# Patient Record
Sex: FEMALE | Race: WHITE | NOT HISPANIC OR LATINO | Employment: FULL TIME | URBAN - METROPOLITAN AREA
[De-identification: names, ages, dates, MRNs, and addresses within clinical notes are randomized per-mention and may not be internally consistent; named-entity substitution may affect disease eponyms.]

---

## 2017-01-04 DIAGNOSIS — N39.3 FEMALE STRESS INCONTINENCE: Primary | ICD-10-CM

## 2017-01-04 RX ORDER — CEFAZOLIN SODIUM 1 G/3ML
1 INJECTION, POWDER, FOR SOLUTION INTRAMUSCULAR; INTRAVENOUS SEE ADMIN INSTRUCTIONS
Status: CANCELLED | OUTPATIENT
Start: 2017-01-04

## 2017-01-04 RX ORDER — CEFAZOLIN SODIUM 1 G/50ML
2 SOLUTION INTRAVENOUS
Status: CANCELLED | OUTPATIENT
Start: 2017-01-04

## 2017-01-06 DIAGNOSIS — N39.3 FEMALE STRESS INCONTINENCE: Primary | ICD-10-CM

## 2017-01-06 NOTE — NURSING NOTE
Discussed surgery details with patient   She is planning to have urine culture at the anesthesia visit     Orders placed     Regina Alan RN   Care Coordinator Urology

## 2017-01-12 ENCOUNTER — ANESTHESIA EVENT (OUTPATIENT)
Dept: SURGERY | Facility: AMBULATORY SURGERY CENTER | Age: 52
End: 2017-01-12

## 2017-01-13 ENCOUNTER — ALLIED HEALTH/NURSE VISIT (OUTPATIENT)
Dept: SURGERY | Facility: CLINIC | Age: 52
End: 2017-01-13

## 2017-01-13 ENCOUNTER — OFFICE VISIT (OUTPATIENT)
Dept: SURGERY | Facility: CLINIC | Age: 52
End: 2017-01-13

## 2017-01-13 VITALS
WEIGHT: 159 LBS | TEMPERATURE: 97.9 F | OXYGEN SATURATION: 98 % | BODY MASS INDEX: 23.55 KG/M2 | DIASTOLIC BLOOD PRESSURE: 77 MMHG | HEART RATE: 54 BPM | SYSTOLIC BLOOD PRESSURE: 132 MMHG | HEIGHT: 69 IN | RESPIRATION RATE: 16 BRPM

## 2017-01-13 DIAGNOSIS — N39.3 FEMALE STRESS INCONTINENCE: ICD-10-CM

## 2017-01-13 DIAGNOSIS — Z01.818 PREOP EXAMINATION: Primary | ICD-10-CM

## 2017-01-13 NOTE — PATIENT INSTRUCTIONS
Preparing for Your Surgery      Name:  Yelena Jacob   MRN:  [id number]   :  1965   Today's Date:  2017     Arriving for surgery:  Surgery date: 17  Surgery time:  0910  Arrival time:  0740  Please come to:     [facility]  [address] [place] [phone]    -  Proceed to the 5th floor to check into the Ambulatory Surgery Center.              >> There will be patient concierges on the 1st and 5th floor, for assistance or an escort, if you would like.              >> Please call [phone] with any questions.    What can I eat or drink?  -  You may have solid food or milk products until 8 hours prior to your surgery.  -  You may have water, apple juice or 7up/Sprite until 2 hours prior to your surgery.    Which medicines can I take?  -  Do NOT take these medications in the morning, the day of surgery:  Aspirin, ibuprofen, supplements    -  Please take these medications the day of surgery:  Tylenol if needed, effexor if taken in the morning    How do I prepare myself?  -  Take two showers: one the night before surgery; and one the morning of surgery.         Use Scrubcare or Hibiclens to wash from neck down.  You may use your own shampoo and conditioner. No other hair products.   -  Do NOT use lotion, powder, deodorant, or antiperspirant the day of your surgery.  -  Do NOT wear any makeup, fingernail polish or jewelry.  -  Begin using Incentive Spirometer 1 week prior to surgery.  Use 4 times per day, up to 5-10 breaths each time.  Bring Incentive Spirometer to hospital.  -Do not bring your own medications to the hospital, except for inhalers and eye drops.  -  Bring your ID and insurance card.    Questions or Concerns:  If you have questions or concerns, please call the  Preoperative Assessment Center, Monday-Friday 7AM-7PM:  [phone]

## 2017-01-13 NOTE — ANESTHESIA PREPROCEDURE EVALUATION
Anesthesia Evaluation     . Pt has had prior anesthetic. Type: General and MAC    No history of anesthetic complications     ROS/MED HX    ENT/Pulmonary:  - neg pulmonary ROS     Neurologic:     (+)migraines,     Cardiovascular:  - neg cardiovascular ROS       METS/Exercise Tolerance:  >4 METS   Hematologic:  - neg hematologic  ROS       Musculoskeletal:  - neg musculoskeletal ROS (+) , , other musculoskeletal- Back surgeries x 2      GI/Hepatic:  - neg GI/hepatic ROS       Renal/Genitourinary:     (+) Other Renal/ Genitourinary, Stress urinary incontinence      Endo:  - neg endo ROS       Psychiatric: Comment: Is not currently using the Effexor.    (+) psychiatric history anxiety and depression      Infectious Disease:  - neg infectious disease ROS       Malignancy:   (+) Malignancy History of Skin  Skin CA Remission status post Surgery,         Other:    - neg other ROS           Physical Exam  Normal systems: dental    Airway   Mallampati: I  TM distance: >3 FB  Neck ROM: full    Dental     Cardiovascular   Rhythm and rate: regular and normal  (-) no peripheral edema    Pulmonary    breath sounds clear to auscultation               PAC Discussion and Assessment    ASA Classification: 2  Case is suitable for: ASC  Anesthetic techniques and relevant risks discussed: MAC with GA as backup  Invasive monitoring and risk discussed: No  Types:   Possibility and Risk of blood transfusion discussed: No  NPO instructions given:   Additional anesthetic preparation and risks discussed:   Needs early admission to pre-op area:   Other:     PAC Resident/NP Anesthesia Assessment:  The patient is a relatively young, healthy, non-smoking, very physically active patient who is preparing for a relatively low-risk procedure.  No further evaluation needed prior to this procedure.    Revised Cardiac Risk Index: 0.4% risk of major adverse cardiac event.  Anesthesia considerations: Refer to PAC assessment in the anesthesia  records.  VTE risk: 0.26%  YG risk: Low  PONV risk score= 2.  (If > 2, anti-emetic intervention is recommended.)        Reviewed and Signed by PAC Mid-Level Provider/Resident  Mid-Level Provider/Resident: Sarah Smith CNP  Date: 1/13/17  Time: 1815    Attending Anesthesiologist Anesthesia Assessment:        Anesthesiologist:   Date:   Time:   Pass/Fail:   Disposition:     PAC Pharmacist Assessment:        Pharmacist:   Date:   Time:      Anesthesia Plan      History & Physical Review  History and physical reviewed and following examination; no interval change.    ASA Status:  1 .    NPO Status:  > 6 hours    Plan for MAC with Intravenous induction. Maintenance will be TIVA.  Reason for MAC:  Deep or markedly invasive procedure (G8)  PONV prophylaxis:  Ondansetron (or other 5HT-3) and Dexamethasone or Solumedrol       Postoperative Care  Postoperative pain management:  Oral pain medications and Multi-modal analgesia.      Consents  Anesthetic plan, risks, benefits and alternatives discussed with:  Patient..                          .

## 2017-01-13 NOTE — MR AVS SNAPSHOT
After Visit Summary   2017    Yelena Jacob    MRN: [id number]           Patient Information     Date Of Birth          1965        Visit Information        Provider Department      2017 11:00 AM Rn, [facility]        Care Instructions    Preparing for Your Surgery      Name:  Yelena Jacob   MRN:  [id number]   :  1965   Today's Date:  2017     Arriving for surgery:  Surgery date: 17  Surgery time:  0910  Arrival time:  0740  Please come to:     [facility]  [address] [place] [phone]    -  Proceed to the 5th floor to check into the Ambulatory Surgery Center.              >> There will be patient concierges on the 1st and 5th floor, for assistance or an escort, if you would like.              >> Please call [phone] with any questions.    What can I eat or drink?  -  You may have solid food or milk products until 8 hours prior to your surgery.  -  You may have water, apple juice or 7up/Sprite until 2 hours prior to your surgery.    Which medicines can I take?  -  Do NOT take these medications in the morning, the day of surgery:  Aspirin, ibuprofen, supplements    -  Please take these medications the day of surgery:  Tylenol if needed, effexor if taken in the morning    How do I prepare myself?  -  Take two showers: one the night before surgery; and one the morning of surgery.         Use Scrubcare or Hibiclens to wash from neck down.  You may use your own shampoo and conditioner. No other hair products.   -  Do NOT use lotion, powder, deodorant, or antiperspirant the day of your surgery.  -  Do NOT wear any makeup, fingernail polish or jewelry.  -  Begin using Incentive Spirometer 1 week prior to surgery.  Use 4 times per day, up to 5-10 breaths each time.  Bring Incentive Spirometer to hospital.  -Do not bring your own medications to the hospital, except for inhalers and eye drops.  -   Bring your ID and insurance card.    Questions or Concerns:  If you have questions or concerns, please call the  Preoperative Assessment Center, Monday-Friday 7AM-7PM:  [phone]                  Follow-ups after your visit        Your next 10 appointments already scheduled     Jan 13, 2017 11:30 AM   (Arrive by 11:15 AM)   PAC EVALUATION with  Pac Jonny 3   [facility]operative [facility] ([facility])    [address] [place] [address]  [address] [facility] [id number] [phone]            Jan 13, 2017 12:30 PM   (Arrive by 12:15 PM)   PAC Anesthesia Consult with  Pac Anesthesiologist   [facility] Preoperative Assessment [place] ([facility])    [address] [place] [address]  [address] [facility] [id number] [phone]            Jan 13, 2017 12:45 PM   LAB with  LAB   [facility] Lab ([facility])    [address] [place] [address]  [address] [facility] [id number] [phone]           Patient must bring picture ID.  Patient should be prepared to give a urine specimen  Please do not eat 10-12 hours before your appointment if you are coming in fasting for labs on lipids, cholesterol, or glucose (sugar).  Pregnant women should follow their Care Team instructions. Water with medications is okay. Do not drink coffee or other fluids.   If you have concerns about taking  your medications, please ask at office or if scheduling via [facility], send a message by clicking on Secure Messaging, Message Your Care Team.            Jan 17, 2017   Procedure with Bahman Noe MD   [facility] Procedure [facility] ([facility])    [address] [place] [address]  [address] [facility] [address] [phone]           Located in the Clinics and Surgery Center at [address] [place] [address].   parking is very convenient and highly recommended.  is a $6 flat rate fee; no tips accepted.  Both  and self  parkers should enter the main arrival plaza from [facility]; parking attendants will direct you based on your parking preference.            2017 10:00 AM   (Arrive by 9:45 AM)   Post-Op with Bahman Noe MD   [facility] ([facility])    [address] [id number] [phone]            Mar 08, 2017  2:45 PM   (Arrive by 2:30 PM)   Post-Op with Bahman Noe MD   [facility] and [facility] ([facility])    [address] [address] [phone]              Who to contact     Please call your clinic at [phone] to:    Ask questions about your health    Make or cancel appointments    Discuss your medicines    Learn about your test results    Speak to your doctor   If you have compliments or concerns about an experience at your clinic, or if you wish to file a complaint, please contact [facility] Patient Relations at [phone] or email us at [email or web address]         Additional Information About Your Visit        [employer] Information     [employer] is an electronic gateway that provides easy, online access to your medical records. With [employer], you can request a clinic appointment, read your test results, renew a prescription or communicate with your care team.     To sign up for [employer] visit the website at [email or web address]   You will be asked to enter the access code listed below, as well as some personal information. Please follow the directions to create your username and password.     Your access code is: [email or web address]  Expires: 2017  6:30 AM     Your access code will  in 90 days. If you need help or a new code, please contact your [facility] or call [phone] for assistance.        Care EveryWhere ID      This is your Care EveryWhere ID. This could be used by other organizations to access your [place] medical records  [id number]         Blood Pressure from Last 3 Encounters:   10/25/16 115/76   09/28/16 113/73   08/30/16 103/63    Weight from Last 3 Encounters:   10/25/16 69.673 kg (153 lb 9.6 oz)   09/28/16 70.308 kg (155 lb)   08/30/16 72.802 kg (160 lb 8 oz)              Today, you had the following     No orders found for display       Primary Care Provider    No Ref-Primary Verified       No address on file        Thank you!     Thank you for choosing [facility]  for your care. Our goal is always to provide you with excellent care. Hearing back from our patients is one way we can continue to improve our services. Please take a few minutes to complete the written survey that you may receive in the mail after your visit with us. Thank you!             Your Updated Medication List - Protect others around you: Learn how to safely use, store and throw away your medicines at www.disposemymeds.org.          This list is accurate as of: 1/13/17 11:03 AM.  Always use your most recent med list.                   Brand Name Dispense Instructions for use    hydrocortisone 25 MG Suppository    ANUSOL-HC    28 suppository    Place 1 suppository (25 mg) rectally 2 times daily       venlafaxine 37.5 MG 24 hr capsule    EFFEXOR-XR    90 capsule    Take 1 capsule (37.5 mg) by mouth daily

## 2017-01-13 NOTE — H&P
Pre-Operative H & P     Date of Encounter: 1/13/2017  Primary Care Physician:  Verified, No Ref-Primary    CC: Stress urinary incontinence.      HPI:  Yelena Jacob is a 51 year old female who presents for pre-operative H & P in preparation for Midurethral Sling and Cystoscopy on 1/17/17 with Dr. Bahman Noe at [facility].     The patient was evaluated by Dr. Noe on 9/28/16 in regards to stress urinary incontinence.  The patient first noted her symptoms approximately 3-4 years ago and they have been getting worse.  Her symptoms are usually associated with laughing, sneezing, and bending over.  After discussion and consideration of her options, the patient has elected to proceed with surgical intervention and arrangements are being made for the above procedures.    History is obtained from the patient and the medical record.    Past Medical History:  Past Medical History   Diagnosis Date     No active medical problems      Heart disease      Past Surgical History:  Past Surgical History   Procedure Laterality Date     Back surgery       Leg surgery       Titanium sri in leg     Hc tooth extraction w/forcep       Colonoscopy  9-1-2015     Hx of Blood transfusions/reactions: Denies.     Hx of abnormal bleeding or anti-platelet use: Denies.    Menstrual history: No LMP recorded. Week of 1/2/17.      Steroid use in the last year: Denies.    Personal or FH of difficulty with anesthesia:  Denies.    Prior to admission medications  Current Outpatient Prescriptions   Medication Sig Dispense Refill     hydrocortisone (ANUSOL-HC) 25 MG suppository Place 1 suppository (25 mg) rectally 2 times daily 28 suppository 1     venlafaxine (EFFEXOR-XR) 37.5 MG 24 hr capsule Take 1 capsule (37.5 mg) by mouth daily (Patient taking differently: Take 37.5 mg by mouth every morning ) 90 capsule 3     Allergies  No Known Allergies    Social History  Social History     Social History     Marital Status:       Spouse Name: N/A     Number of Children: N/A     Years of Education: N/A     Occupational History           Social History Main Topics     Smoking status: Never Smoker      Smokeless tobacco: Never Used     Alcohol Use: Yes      Comment: 2 glasses of wine per night     Drug Use: No     Sexual Activity:     Partners: Male     Birth Control/ Protection: None     Other Topics Concern     Parent/Sibling W/ Cabg, Mi Or Angioplasty Before 65f 55m? Yes     Social History Narrative    How much exercise per week? 4-5 days    How much calcium per day? diet       How much caffeine per day? 1 large coffee    How much vitamin D per day? diet    Do you/your family wear seatbelts?  Yes    Do you/your family use safety helmets? Yes    Do you/your family use sunscreen? Yes    Do you/your family keep firearms in the home? No    Do you/your family have a smoke detector(s)? Yes        Do you feel safe in your home? Yes    Has anyone ever touched you in an unwanted manner? No     Explain     Zayda Light, [facility]    04/14/15             Family History  Family History   Problem Relation Age of Onset     HEART DISEASE Paternal Grandfather      HEART DISEASE Father      Review of Systems  Functional status: Independent in ADL's.  >4 METS.     The complete review of systems is negative other than noted in the HPI or here.   Constitutional: Denies recent changes in weight, sleeping patterns, or fevers/chills.  Eyes: No recent vision changes.  EENT: Denies recent changes in hearing, mouth pain, or difficulty swallowing.  Cardiovascular: Denies chest pain, LLOYD or orthopnea, or palpitations.  Respiratory: Denies shortness of breath or significant cough.    GI: Denies nausea/vomiting or diarrhea/constipation.    : Denies dysuria, but continues to have issues with stress urinary incontinence.    Musculoskeletal: Denies joint pain or swelling.    Skin: Denies rashes or wounds.    Hematologic: Denies easy bruising or bleeding.   "  Neurologic: Denies migraines, seizures, dizziness, numbness/tingling.  Psychiatric: Denies changes in mood or affect.      /77 mmHg  Pulse 54  Temp(Src) 97.9  F (36.6  C) (Oral)  Resp 16  Ht 1.753 m (5' 9\")  Wt 72.122 kg (159 lb)  BMI 23.47 kg/m2  SpO2 98%    159 lbs 0 oz  5' 9\"   Body mass index is 23.47 kg/(m^2).    Physical Exam  Constitutional: Patient awake, seated upright in a chair, in no apparent distress.  Appears stated age.  Eyes: Pupils equal, round and reactive to light.  Extra ocular muscles intact. Sclera clear.  Conjunctiva normal.  HENT: Head normocephalic.  Oral pharynx intact with moist mucous membranes.  Dentition intact.  No thyromegaly appreciated.   Respiratory: Lung sounds clear to auscultation bilaterally.  No rales, rhonchi, or wheezing noted.    Cardiovascular: S1, S2, regular rate and rhythm.  No murmurs, rubs, or gallops noted. Radial and pedal pulses palpable, bilaterally.  No edema noted.   GI: Bowel sounds present.  Abdomen flat, soft, non-tender to light palpation.  No hepatosplenomegaly or masses palpated.   Genitourinary: Exam deferred.  Lymph/Hematologic: No cervical or supraclavicular lymphadenopathy noted.  No excessive bruising noted.    Skin: Color appropriate for race, warm, dry.   Musculoskeletal: Full extension of the neck.  No redness, warmth, or swelling of the joints noted. Gross motor strength is normal.    Neurologic: Alert, oriented to name, place and time. Cranial nerves II-XII are grossly intact. Gait is normal.  Denies numbness/tingling.    Neuropsychiatric: Calm, cooperative. Normal affect.     Assessment and Plan  Yelena Jacob is a 51 year old female scheduled to undergo Midurethral Sling and Cystoscopy on 1/17/17 with Dr. Bahman Noe.    She has the following specific operative considerations:   The patient is a relatively young, healthy, non-smoking, very physically active patient who is preparing for a relatively low-risk procedure.  No " further evaluation needed prior to this procedure.    Revised Cardiac Risk Index: 0.4% risk of major adverse cardiac event.  Anesthesia considerations: Refer to PAC assessment in the anesthesia records.  VTE risk: 0.26%  YG risk: Low  PONV risk score= 2.  (If > 2, anti-emetic intervention is recommended.)    Sarah Smith NP  Preoperative Assessment Center  [facility]  Phone: [phone]  Fax: [phone]

## 2017-01-14 LAB
BACTERIA SPEC CULT: NORMAL
Lab: NORMAL
MICRO REPORT STATUS: NORMAL
SPECIMEN SOURCE: NORMAL

## 2017-01-17 ENCOUNTER — ANESTHESIA (OUTPATIENT)
Dept: SURGERY | Facility: AMBULATORY SURGERY CENTER | Age: 52
End: 2017-01-17

## 2017-01-17 ENCOUNTER — HOSPITAL ENCOUNTER (OUTPATIENT)
Facility: AMBULATORY SURGERY CENTER | Age: 52
End: 2017-01-17
Attending: UROLOGY

## 2017-01-17 ENCOUNTER — TELEPHONE (OUTPATIENT)
Dept: UROLOGY | Facility: CLINIC | Age: 52
End: 2017-01-17

## 2017-01-17 VITALS
WEIGHT: 155 LBS | HEART RATE: 54 BPM | SYSTOLIC BLOOD PRESSURE: 111 MMHG | RESPIRATION RATE: 16 BRPM | HEIGHT: 69 IN | BODY MASS INDEX: 22.96 KG/M2 | OXYGEN SATURATION: 100 % | DIASTOLIC BLOOD PRESSURE: 69 MMHG | TEMPERATURE: 98.6 F

## 2017-01-17 DIAGNOSIS — N39.3 STRESS INCONTINENCE: Primary | ICD-10-CM

## 2017-01-17 LAB
HCG UR QL: NORMAL
INTERNAL QC OK POCT: YES

## 2017-01-17 DEVICE — MESH SLING SINGLE INCISION ALTIS 519650: Type: IMPLANTABLE DEVICE | Site: BLADDER | Status: FUNCTIONAL

## 2017-01-17 RX ORDER — ONDANSETRON 2 MG/ML
INJECTION INTRAMUSCULAR; INTRAVENOUS PRN
Status: DISCONTINUED | OUTPATIENT
Start: 2017-01-17 | End: 2017-01-17

## 2017-01-17 RX ORDER — LIDOCAINE 40 MG/G
CREAM TOPICAL
Status: DISCONTINUED | OUTPATIENT
Start: 2017-01-17 | End: 2017-01-17 | Stop reason: HOSPADM

## 2017-01-17 RX ORDER — GABAPENTIN 300 MG/1
300 CAPSULE ORAL ONCE
Status: COMPLETED | OUTPATIENT
Start: 2017-01-17 | End: 2017-01-17

## 2017-01-17 RX ORDER — SODIUM CHLORIDE, SODIUM LACTATE, POTASSIUM CHLORIDE, CALCIUM CHLORIDE 600; 310; 30; 20 MG/100ML; MG/100ML; MG/100ML; MG/100ML
INJECTION, SOLUTION INTRAVENOUS CONTINUOUS
Status: DISCONTINUED | OUTPATIENT
Start: 2017-01-17 | End: 2017-01-17 | Stop reason: HOSPADM

## 2017-01-17 RX ORDER — LIDOCAINE HYDROCHLORIDE 20 MG/ML
INJECTION, SOLUTION INFILTRATION; PERINEURAL PRN
Status: DISCONTINUED | OUTPATIENT
Start: 2017-01-17 | End: 2017-01-17

## 2017-01-17 RX ORDER — BUPIVACAINE HYDROCHLORIDE AND EPINEPHRINE 2.5; 5 MG/ML; UG/ML
INJECTION, SOLUTION INFILTRATION; PERINEURAL PRN
Status: DISCONTINUED | OUTPATIENT
Start: 2017-01-17 | End: 2017-01-17 | Stop reason: HOSPADM

## 2017-01-17 RX ORDER — POLYETHYLENE GLYCOL 3350 17 G/17G
1 POWDER, FOR SOLUTION ORAL DAILY
Qty: 238 G | Refills: 0 | Status: SHIPPED
Start: 2017-01-17 | End: 2018-01-25

## 2017-01-17 RX ORDER — CEFAZOLIN SODIUM 1 G/3ML
1 INJECTION, POWDER, FOR SOLUTION INTRAMUSCULAR; INTRAVENOUS SEE ADMIN INSTRUCTIONS
Status: DISCONTINUED | OUTPATIENT
Start: 2017-01-17 | End: 2017-01-17 | Stop reason: HOSPADM

## 2017-01-17 RX ORDER — ACETAMINOPHEN 325 MG/1
975 TABLET ORAL ONCE
Status: COMPLETED | OUTPATIENT
Start: 2017-01-17 | End: 2017-01-17

## 2017-01-17 RX ORDER — OXYCODONE HYDROCHLORIDE 5 MG/1
5-10 TABLET ORAL
Qty: 20 TABLET | Refills: 0 | Status: SHIPPED | OUTPATIENT
Start: 2017-01-17 | End: 2017-01-17

## 2017-01-17 RX ORDER — PROPOFOL 10 MG/ML
INJECTION, EMULSION INTRAVENOUS CONTINUOUS PRN
Status: DISCONTINUED | OUTPATIENT
Start: 2017-01-17 | End: 2017-01-17

## 2017-01-17 RX ORDER — OXYCODONE HYDROCHLORIDE 5 MG/1
5 TABLET ORAL ONCE
Status: DISCONTINUED | OUTPATIENT
Start: 2017-01-17 | End: 2017-01-18 | Stop reason: HOSPADM

## 2017-01-17 RX ORDER — PROPOFOL 10 MG/ML
INJECTION, EMULSION INTRAVENOUS PRN
Status: DISCONTINUED | OUTPATIENT
Start: 2017-01-17 | End: 2017-01-17

## 2017-01-17 RX ORDER — FENTANYL CITRATE 50 UG/ML
INJECTION, SOLUTION INTRAMUSCULAR; INTRAVENOUS PRN
Status: DISCONTINUED | OUTPATIENT
Start: 2017-01-17 | End: 2017-01-17

## 2017-01-17 RX ORDER — HYDROCODONE BITARTRATE AND ACETAMINOPHEN 5; 325 MG/1; MG/1
1-2 TABLET ORAL EVERY 4 HOURS PRN
Qty: 20 TABLET | Refills: 0 | Status: SHIPPED | OUTPATIENT
Start: 2017-01-17 | End: 2018-01-25

## 2017-01-17 RX ADMIN — SODIUM CHLORIDE, SODIUM LACTATE, POTASSIUM CHLORIDE, CALCIUM CHLORIDE: 600; 310; 30; 20 INJECTION, SOLUTION INTRAVENOUS at 08:17

## 2017-01-17 RX ADMIN — SODIUM CHLORIDE, SODIUM LACTATE, POTASSIUM CHLORIDE, CALCIUM CHLORIDE: 600; 310; 30; 20 INJECTION, SOLUTION INTRAVENOUS at 09:15

## 2017-01-17 RX ADMIN — ONDANSETRON 4 MG: 2 INJECTION INTRAMUSCULAR; INTRAVENOUS at 09:25

## 2017-01-17 RX ADMIN — GABAPENTIN 300 MG: 300 CAPSULE ORAL at 08:17

## 2017-01-17 RX ADMIN — ACETAMINOPHEN 975 MG: 325 TABLET ORAL at 08:17

## 2017-01-17 RX ADMIN — PROPOFOL 50 MG: 10 INJECTION, EMULSION INTRAVENOUS at 09:26

## 2017-01-17 RX ADMIN — PROPOFOL 150 MCG/KG/MIN: 10 INJECTION, EMULSION INTRAVENOUS at 09:26

## 2017-01-17 RX ADMIN — LIDOCAINE HYDROCHLORIDE 70 MG: 20 INJECTION, SOLUTION INFILTRATION; PERINEURAL at 09:26

## 2017-01-17 RX ADMIN — FENTANYL CITRATE 25 MCG: 50 INJECTION, SOLUTION INTRAMUSCULAR; INTRAVENOUS at 09:37

## 2017-01-17 RX ADMIN — FENTANYL CITRATE 50 MCG: 50 INJECTION, SOLUTION INTRAMUSCULAR; INTRAVENOUS at 09:25

## 2017-01-17 NOTE — ANESTHESIA POSTPROCEDURE EVALUATION
Patient: Yelena Jacob    CYSTOSCOPY (N/A Urethra)  Additional InformationProcedure(s):  Midurethral Sling and Cystoscopy  - Wound Class: II-Clean Contaminated    Diagnosis:Stress Incontinence (Leakage with Straining)  Diagnosis Additional Information: No value filed.    Anesthesia Type:  MAC    Note:  Anesthesia Post Evaluation    Patient location during evaluation: PACU  Patient participation: Able to fully participate in evaluation  Level of consciousness: awake and alert  Pain management: adequate  Airway patency: patent  Cardiovascular status: hemodynamically stable  Respiratory status: nonlabored ventilation, spontaneous ventilation and room air  Hydration status: euvolemic  PONV: none     Anesthetic complications: None          Last vitals:  Filed Vitals:    01/17/17 0756 01/17/17 1013 01/17/17 1030   BP: 114/74 102/60 111/69   Pulse: 54     Temp: 37.4  C (99.3  F) 36.9  C (98.4  F) 37  C (98.6  F)   Resp: 16 16 16   SpO2: 97% 99% 100%       Electronically Signed By: Nico Rowley MD  January 17, 2017  12:43 PM

## 2017-01-17 NOTE — OP NOTE
PREOPERATIVE DIAGNOSES: 1. Stress urinary incontinence. 2. Urethral hypermobility.  POSTOPERATIVE DIAGNOSES: Same  PROCEDURE PERFORMED: 1. Mid urethral sling (Coloplast Altis). 2. Cystoscopy.   ATTENDING PHYSICIAN: Bahman Noe MD   RESIDENT SURGEON: Beatriz Burt MD  ANESTHESIA: Monitored anesthesia care with 5 mL of 1% lidocaine with epinephrine and 10 mL Xylocaine jelly per urethra.   ESTIMATED BLOOD LOSS:20 mL.   FINDINGS:  No mesh in the bladder  SPECIMENS:  None    HISTORY OF PRESENT ILLNESS: Yelena Jacob is a 51 year old year-old woman with a history of stress urinary incontinence. Differing management options were discussed with the patient and she has elected to proceed with a mid urethral sling.    DESCRIPTION OF PROCEDURE: After informed consent was obtained, the patient was identified, marked and taken to the operating room in her usual state of health. After induction with monitored anesthesia care, she was positioned in modified lithotomy position. Pneumo boots were placed. A timeout was performed to ensure proper patient, procedure and positioning. The patient received 1 gram of Ancef prior to initiation of surgery. She was prepped with Betadine. Xylocaine jelly of 10 mL were inserted into the urethra and a 18-Belarusian Lieberman catheter was placed.     An Allis clamp was used to grasp the vaginal mucosa approximately 1 cm from the urethral meatus at the mid urethra. A 25-gauge needle, we injected 10 mL of 1% lidocaine with epinephrine to hydrodissect the tissues. We then made a 1-cm incision through the vaginal mucosa with a Metzenbaum scissors. We then dissected this laterally out passed the fornices of the vagina in the pubocervical fascia, out laterally to the pelvic side wall, first on the right-hand side and then on the left hand side. We then placed the Altis sling on the trocar and placed this first through the obturator membrane on the left side and then deployed it and then placed it  on the right hand side and deployed it. The sling lay flat.  The string was used to place it under appropriate tension.  We then placed a 20-Occitan cystoscope through the urethra and into the bladder, noting bilateral ureteral orifices. The floor of the bladder was intact as well as the bladder neck and the lateral edges, no mesh was seen. The urethra was without tape. We then withdrew the cystoscope with a full bladder and pressed on the suprapubic region and did not note any incontinence from the urethral meatus. At this time the string was cut and the wound was irrigated.  A running 2-0 Vicryl was then used to reapproximate the mucosa of the vagina. The patient was then returned to the supine position and transported to recovery in stable condition.     ASSESSMENT AND PLAN: Yelena Jacob is a 51 year old year-old woman who underwent a midurethral sling for stress urinary incontinence. We will plan to follow up with her in 2 weeks in Urology Clinic. Obtain residual urine and evaluate for obstructive urinary symptoms.     Dr. Bahman Noe was present for all critical portions of this procedure.    Beatriz Burt MD  Patient was seen, evaluated and plan was formulated in conjunction with me and I agree with the above.  I was present for the entire procedure.  Bahman Noe MD

## 2017-01-17 NOTE — IP AVS SNAPSHOT
[facility]    [address] [place] [address]    Phone:  [phone]    Fax:  [phone]                                       After Visit Summary   1/17/2017    Yelena Jacob    MRN: [id number]           After Visit Summary Signature Page     I have received my discharge instructions, and my questions have been answered. I have discussed any challenges I see with this plan with the nurse or doctor.    ..........................................................................................................................................  Patient/Patient Representative Signature      ..........................................................................................................................................  Patient Representative Print Name and Relationship to Patient    ..................................................               ................................................  Date                                            Time    ..........................................................................................................................................  Reviewed by Signature/Title    ...................................................              ..............................................  Date                                                            Time

## 2017-01-17 NOTE — ANESTHESIA CARE TRANSFER NOTE
Patient: Yelena Jacob    CYSTOSCOPY (N/A Urethra)  Additional InformationProcedure(s):  Midurethral Sling and Cystoscopy  - Wound Class: II-Clean Contaminated    Diagnosis: Stress Incontinence (Leakage with Straining)  Diagnosis Additional Information: No value filed.    Anesthesia Type:   MAC     Note:  Airway :Room Air  Patient transferred to:Phase II  Comments: Arrive Phase II, Stable, Airway Intact  102/60, 53,12,98%,36.9          Vitals: (Last set prior to Anesthesia Care Transfer)              Electronically Signed By: TWILA Vela CRNA  January 17, 2017  10:12 AM

## 2017-01-17 NOTE — IP AVS SNAPSHOT
MRN:[id number]                      After Visit Summary   1/17/2017    Yelena Jacob    MRN: [id number]           Thank you!     Thank you for choosing [facility] for your care. Our goal is always to provide you with excellent care. Hearing back from our patients is one way we can continue to improve our services. Please take a few minutes to complete the written survey that you may receive in the mail after you visit with us. Thank you!        Patient Information     Date Of Birth          1965        About your hospital stay     You were admitted on:  January 17, 2017 [facility]    You were discharged on:  January 17, 2017       Who to Call     For medical emergencies, please call 911.  For non-urgent questions about your medical care, please call your primary care provider or clinic, None  For questions related to your surgery, please call your surgery clinic        Attending Provider     Provider    Bahman Noe MD       Primary Care Provider    No Ref-Primary Verified       No address on file        After Care Instructions     Diet Instructions       Resume pre procedure diet            Discharge Instructions       Activity  - No strenuous exercise for 6 weeks.  - No lifting, pushing, pulling more than 10 pounds for 6 weeks.   - Nothing in vagina x 6 weeks.  - Do not strain with bowel movements.  - Do not drive until you can press the brake pedal quickly and fully without pain.   - Do not operate a motor vehicle while taking narcotic pain medications.     Vaginal Incisions  - You may shower and get incisions wet starting 48 hrs after surgery.  - Do not scrub incisions or submerge wounds for 2 weeks or until seen in follow-up.   - The stitches do not need to be removed, they will dissolve on their own.    URINATION  - Blood tinged urine is expected after urologic procedures.   - Drink plenty of fluids to keep urine clear.  - If  "you are unable to urinate for more than 4-6 hours, call the clinic.  - If you were discharged with a catheter, keep it secured to your leg at all times. If catheter becomes clogged, call the numbers below immediately.    Medications  - Transition from narcotic pain medications to tylenol (acetaminophen) as you are able.  Wean yourself off all pain medications as you are able.  - Some pain medications contain both tylenol (acetaminophen) and a narcotic (Norco, vicodin, percocet), do not take more than 4,000mg of Tylenol (acetaminophen) from all sources in any 24 hour period.  - Narcotics can make you constipated.  Take over the counter fiber (metamucil or benefiber) and stool softeners (miralax, docusate or senna) while taking narcotic pain medications, but stop if you develop diarrhea.  - No driving or operating machinery while taking narcotic pain medications     Follow-Up:  - Follow-up with Dr. Noe on 2/1/2017    - Call or return sooner than your regularly scheduled visit if you develop any of the following: fever (greater than 101.5), uncontrolled pain, uncontrolled nausea or vomiting, as well as increased redness, swelling, or drainage from your wound.     Phone numbers:   - Monday through Friday 8am to 4:30pm: Call [phone] with routine questions or to schedule or confirm appointment.    - For URGENT questions or concerns on Nights or weekends: call the after hours emergency pager - [phone] and tell the  \"I would like to page the Urology Resident on call.\"   - For emergencies, call [phone]            Encourage fluids       Encourage fluids at home to keep urine clear to light pink            No Alcohol       for 24 hours post procedure            No driving or operating machinery        until the day after procedure                  Your next 10 appointments already scheduled     Feb 01, 2017 10:00 AM   (Arrive by 9:45 AM)   Post-Op with Bahman Noe MD   [facility] and [facility] for " Prostate and Urologic Cancers ([facility] [place])    [address] [facility]  [address] [facility] [address]   [phone]            Mar 08, 2017  2:45 PM   (Arrive by 2:30 PM)   Post-Op with Bahman Noe MD   [facility] and [facility] for Prostate and Urologic Cancers ([facility] [place])    [address] [address] [phone]              Further instructions from your care team       [facility]  Home Care Following Anesthesia  For 24 hours after surgery:  1. Get plenty of rest.  A responsible adult must stay with you for at least 24 hours after you leave the surgery center.  2. Do not drive or use heavy equipment.  If you have weakness or tingling, don't drive or use heavy equipment until this feeling goes away.   3. Do not drink alcohol.   4. Avoid strenuous or risky activities.  Ask for help when climbing stairs.  5. You may feel lightheaded.  IF so, sit for a few minutes before standing.  Have someone help you get up.   6. If you have nausea (feel sick to your stomach): Drink only clear liquids such as apple juice, ginger ale, broth or 7-Up.  Rest may also help.  Be sure to drink enough fluids.  Move to a regular diet as you feel able.   7. You may have a slight fever.  Call the doctor if your fever is over 100 F (37.7 C) (taken under the tongue) or lasts longer than 24 hours.  8. You may have a dry mouth, a sore throat, muscle aches or trouble sleeping. These should go away after 24 hours.  9. Do not make important or legal decisions.   If you are female and have had general anesthesia you may have received a medication that inhibits the effectiveness of oral contraceptives.  We suggest you use a backup method of contraception for the next 7 days if this applies to you.  Tips for taking pain medications  To get the best pain relief possible, remember these points:    Take pain medications  "as directed, before pain becomes severe.    Pain medication can upset your stomach: taking it with food may help.    Constipation is a common side effect of pain medication. Drink plenty of  fluids.    Eat foods high in fiber. Take a stool softener if recommended by your doctor or pharmacist.    Do not drink alcohol, drive or operate machinery while taking pain medications.    Ask about other ways to control pain, such as with heat, ice or relaxation.    Call a doctor for any of the followin. Signs of infection (fever, growing tenderness at the surgery site, a large amount of drainage or bleeding, severe pain, foul-smelling drainage, redness, swelling).  2. It has been over 8 to 10 hours since surgery and you are still not able to urinate (pass water).  3. Headache for over 24 hours.  4. Numbness, tingling or weakness the day after surgery (if you had spinal anesthesia).    Your doctor is:   Dr. Bahman Noe, Prostate and Urology: [phone]               Or dial [phone] and ask for the resident on call for:  Prostate Urology  For emergency care, call the:  [facility]:  [phone] (TTY for hearing impaired: [phone])                  Pending Results     No orders found from 2017 to 2017.            Admission Information        Provider Department Dept Phone    2017 Bahman Noe MD  [place] [phone]      Your Vitals Were     Blood Pressure Pulse Temperature    114/74 mmHg 54 99.3  F (37.4  C) (Oral)    Respirations Height Weight    16 1.753 m (5' 9\") 70.308 kg (155 lb)    BMI (Body Mass Index) Pulse Oximetry       22.88 kg/m2 97%       [facility] Information     Anpro21 is an electronic gateway that provides easy, online access to your medical records. With Anpro21, you can request a clinic appointment, read your test results, renew a prescription or communicate with your care team.     To sign up for Anpro21 visit the website at " [email or web address]   You will be asked to enter the access code listed below, as well as some personal information. Please follow the directions to create your username and password.     Your access code is: [email or web address]  Expires: 2017  6:30 AM     Your access code will  in 90 days. If you need help or a new code, please contact your [facility] or call [phone] for assistance.        Care EveryWhere ID     This is your Care EveryWhere ID. This could be used by other organizations to access your [facility] medical records  [id number]           Review of your medicines      START taking        Dose / Directions    HYDROcodone-acetaminophen 5-325 MG per tablet   Commonly known as:  NORCO   Used for:  Stress incontinence        Dose:  1-2 tablet   Take 1-2 tablets by mouth every 4 hours as needed for moderate to severe pain   Quantity:  20 tablet   Refills:  0       polyethylene glycol powder   Commonly known as:  MIRALAX/GLYCOLAX   Used for:  Stress incontinence        Dose:  1 capful   Take 17 g (1 capful) by mouth daily Mix 1 capful in 8 ounces of water, juice or soda.  To prevent constipation while taking narcotic  pain medication.  Follow with 8 ounces of water.  Discontinue  if you have loose stools or when you are no longer taking narcotics.   Quantity:  238 g   Refills:  0         CONTINUE these medicines which may have CHANGED, or have new prescriptions. If we are uncertain of the size of tablets/capsules you have at home, strength may be listed as something that might have changed.        Dose / Directions    venlafaxine 37.5 MG 24 hr capsule   Commonly known as:  EFFEXOR-XR   This may have changed:  when to take this   Used for:  Menopausal syndrome (hot flashes)        Dose:  37.5 mg   Take 1 capsule (37.5 mg) by mouth daily   Quantity:  90 capsule   Refills:  3         CONTINUE these medicines which have NOT CHANGED        Dose / Directions     hydrocortisone 25 MG Suppository   Commonly known as:  ANUSOL-HC   Used for:  Hemorrhage of rectum and anus        Dose:  25 mg   Place 1 suppository (25 mg) rectally 2 times daily   Quantity:  28 suppository   Refills:  1            Where to get your medicines      These medications were sent to [facility] [place], [place] - [address]  [address], [facility] [address]    Hours:  TRANSPLANT PHONE NUMBER [phone] Phone:  [phone]    - polyethylene glycol powder      Some of these will need a paper prescription and others can be bought over the counter. Ask your nurse if you have questions.     Bring a paper prescription for each of these medications    - HYDROcodone-acetaminophen 5-325 MG per tablet             Protect others around you: Learn how to safely use, store and throw away your medicines at www.disposemymeds.org.             Medication List: This is a list of all your medications and when to take them. Check marks below indicate your daily home schedule. Keep this list as a reference.      Medications           Morning Afternoon Evening Bedtime As Needed    HYDROcodone-acetaminophen 5-325 MG per tablet   Commonly known as:  NORCO   Take 1-2 tablets by mouth every 4 hours as needed for moderate to severe pain                                hydrocortisone 25 MG Suppository   Commonly known as:  ANUSOL-HC   Place 1 suppository (25 mg) rectally 2 times daily                                polyethylene glycol powder   Commonly known as:  MIRALAX/GLYCOLAX   Take 17 g (1 capful) by mouth daily Mix 1 capful in 8 ounces of water, juice or soda.  To prevent constipation while taking narcotic  pain medication.  Follow with 8 ounces of water.  Discontinue  if you have loose stools or when you are no longer taking narcotics.                                venlafaxine 37.5 MG 24 hr capsule   Commonly known as:  EFFEXOR-XR   Take 1 capsule (37.5 mg) by  mouth daily

## 2017-01-17 NOTE — DISCHARGE INSTRUCTIONS
[facility]  Home Care Following Anesthesia  For 24 hours after surgery:  1. Get plenty of rest.  A responsible adult must stay with you for at least 24 hours after you leave the surgery center.  2. Do not drive or use heavy equipment.  If you have weakness or tingling, don't drive or use heavy equipment until this feeling goes away.   3. Do not drink alcohol.   4. Avoid strenuous or risky activities.  Ask for help when climbing stairs.  5. You may feel lightheaded.  IF so, sit for a few minutes before standing.  Have someone help you get up.   6. If you have nausea (feel sick to your stomach): Drink only clear liquids such as apple juice, ginger ale, broth or 7-Up.  Rest may also help.  Be sure to drink enough fluids.  Move to a regular diet as you feel able.   7. You may have a slight fever.  Call the doctor if your fever is over 100 F (37.7 C) (taken under the tongue) or lasts longer than 24 hours.  8. You may have a dry mouth, a sore throat, muscle aches or trouble sleeping. These should go away after 24 hours.  9. Do not make important or legal decisions.   If you are female and have had general anesthesia you may have received a medication that inhibits the effectiveness of oral contraceptives.  We suggest you use a backup method of contraception for the next 7 days if this applies to you.  Tips for taking pain medications  To get the best pain relief possible, remember these points:    Take pain medications as directed, before pain becomes severe.    Pain medication can upset your stomach: taking it with food may help.    Constipation is a common side effect of pain medication. Drink plenty of  fluids.    Eat foods high in fiber. Take a stool softener if recommended by your doctor or pharmacist.    Do not drink alcohol, drive or operate machinery while taking pain medications.    Ask about other ways to control pain, such as with heat, ice or relaxation.    Call a doctor  for any of the followin. Signs of infection (fever, growing tenderness at the surgery site, a large amount of drainage or bleeding, severe pain, foul-smelling drainage, redness, swelling).  2. It has been over 8 to 10 hours since surgery and you are still not able to urinate (pass water).  3. Headache for over 24 hours.  4. Numbness, tingling or weakness the day after surgery (if you had spinal anesthesia).    Your doctor is:   Dr. Bahman Noe, Prostate and Urology: [phone]               Or dial [phone] and ask for the resident on call for:  Prostate Urology  For emergency care, call the:  [place] [facility]:  [phone] (TTY for hearing impaired: [phone])

## 2017-01-18 ENCOUNTER — TELEPHONE (OUTPATIENT)
Dept: UROLOGY | Facility: CLINIC | Age: 52
End: 2017-01-18

## 2017-01-18 NOTE — TELEPHONE ENCOUNTER
----- Message from Radha Estrada LPN sent at 1/17/2017  4:50 PM CST -----  Regarding: Pt doing much better  Hi Kait,    Pt calling back to let you know she's doing much better not bleeding at all since you told her to lay down.    Thanks,  Radha

## 2017-01-20 ENCOUNTER — CARE COORDINATION (OUTPATIENT)
Dept: UROLOGY | Facility: CLINIC | Age: 52
End: 2017-01-20

## 2017-01-20 ENCOUNTER — PRE VISIT (OUTPATIENT)
Dept: UROLOGY | Facility: CLINIC | Age: 52
End: 2017-01-20

## 2017-01-20 NOTE — TELEPHONE ENCOUNTER
Patient coming in for post operative check up S/P mid urethral sling (Coloplast Altis) and cystoscopy. Patient chart reviewed, no need for call, all records available and ready for appointment.

## 2017-01-20 NOTE — ADDENDUM NOTE
Addendum  created 01/20/17 1138 by Mehul Gurrola APRN CRNA    Modules edited: Anesthesia Medication Administration

## 2017-01-20 NOTE — PROGRESS NOTES
Urology Postop Phone Note:    Ms. Yelena Jacob is a 51 year old female who underwent midurethral sling and cystoscopy (support the urethra with mesh tape and look in the bladder) on 1/17/17 with Dr. Noe for a history of stress incontinence.  Left message to call to let us know how she is.      Regina Alan   Department of Urologic Surgery

## 2017-03-01 ENCOUNTER — PRE VISIT (OUTPATIENT)
Dept: UROLOGY | Facility: CLINIC | Age: 52
End: 2017-03-01

## 2017-03-08 ENCOUNTER — OFFICE VISIT (OUTPATIENT)
Dept: UROLOGY | Facility: CLINIC | Age: 52
End: 2017-03-08

## 2017-03-08 VITALS
WEIGHT: 155 LBS | DIASTOLIC BLOOD PRESSURE: 68 MMHG | HEART RATE: 62 BPM | HEIGHT: 69 IN | SYSTOLIC BLOOD PRESSURE: 125 MMHG | BODY MASS INDEX: 22.96 KG/M2

## 2017-03-08 DIAGNOSIS — N39.3 FEMALE STRESS INCONTINENCE: Primary | ICD-10-CM

## 2017-03-08 ASSESSMENT — PAIN SCALES - GENERAL: PAINLEVEL: NO PAIN (0)

## 2017-03-08 NOTE — PROGRESS NOTES
Reason for Visit: 6 week post op for A midurethral sling on 1/17/17.    Clinical Data: Ms. Yelena Jacob  is a 51 year old year old   female with a history of the above.   She returns today for her post operative visit.  She states that she is doing well.  No stress incontinence, no urinary urgency no urge inontinence.  She is voiding well.  Not straining.    On exam:  Residual mild urethral mobility  Vaginal incision well healed  No evidence of mesh extrusion or erosion    A/P s/p midurethral sling doing well  -f/u prn  -discontinue restrictions of lifting and sexual activity      Thank you for allowing me to participate in the care of  Ms.Alison Jacob and I will keep you updated on her progress.    Bahman Noe MD

## 2017-03-08 NOTE — LETTER
3/8/2017       RE: Yelena Jacob  [address]  [facility] [address]     Dear Colleague,    Thank you for referring your patient, Yelena Jacob, to the [facility] UROLOGY AND INST FOR PROSTATE AND UROLOGIC CANCERS at [facility]. Please see a copy of my visit note below.    Reason for Visit: 6 week post op for A midurethral sling on 1/17/17.    Clinical Data: Ms. Yelena Jacob  is a 51 year old year old   female with a history of the above.   She returns today for her post operative visit.  She states that she is doing well.  No stress incontinence, no urinary urgency no urge inontinence.  She is voiding well.  Not straining.    On exam:  Residual mild urethral mobility  Vaginal incision well healed  No evidence of mesh extrusion or erosion    A/P s/p midurethral sling doing well  -f/u prn  -discontinue restrictions of lifting and sexual activity      Thank you for allowing me to participate in the care of  Ms.Alison Jacob and I will keep you updated on her progress.    Bahman Noe MD

## 2017-03-08 NOTE — MR AVS SNAPSHOT
After Visit Summary   3/8/2017    Yelena Jacob    MRN: [id number]           Patient Information     Date Of Birth          1965        Visit Information        Provider Department      3/8/2017 2:45 PM Bahman Noe MD [facility] Prostate and Urologic C[facility]        Today's Diagnoses     Female stress incontinence    -  1       Follow-ups after your visit        Follow-up notes from your care team     Return if symptoms worsen or fail to improve.      Who to contact     Please call your clinic at [phone] to:    Ask questions about your health    Make or cancel appointments    Discuss your medicines    Learn about your test results    Speak to your doctor   If you have compliments or concerns about an experience at your clinic, or if you wish to file a complaint, please contact [facility] Patient Relations at [phone] or email us at [email or web address]         Additional Information About Your Visit        MyChart Information     The 517 travelt is an electronic gateway that provides easy, online access to your medical records. With FamilyID, you can request a clinic appointment, read your test results, renew a prescription or communicate with your care team.     To sign up for The 517 travelt visit the website at [email or web address]   You will be asked to enter the access code listed below, as well as some personal information. Please follow the directions to create your username and password.     Your access code is: [email or web address]  Expires: 2017  6:30 AM     Your access code will  in 90 days. If you need help or a new code, please contact your [facility] or call [phone] for assistance.        Care EveryWhere ID     This is your Care EveryWhere ID. This could be used by other organizations to access your [facility] medical records  [id number]        Your Vitals Were     Pulse Height BMI  "(Body Mass Index)             62 1.753 m (5' 9\") 22.89 kg/m2          Blood Pressure from Last 3 Encounters:   03/08/17 125/68   01/17/17 111/69   01/13/17 132/77    Weight from Last 3 Encounters:   03/08/17 70.3 kg (155 lb)   01/17/17 70.3 kg (155 lb)   01/13/17 72.1 kg (159 lb)              Today, you had the following     No orders found for display         Today's Medication Changes          These changes are accurate as of: 3/8/17  2:56 PM.  If you have any questions, ask your nurse or doctor.               These medicines have changed or have updated prescriptions.        Dose/Directions    venlafaxine 37.5 MG 24 hr capsule   Commonly known as:  EFFEXOR-XR   This may have changed:  when to take this   Used for:  Menopausal syndrome (hot flashes)        Dose:  37.5 mg   Take 1 capsule (37.5 mg) by mouth daily   Quantity:  90 capsule   Refills:  3                Primary Care Provider    No Ref-Primary Verified       No address on file        Thank you!     Thank you for choosing [facility] UROLOGY AND [facility] FOR PROSTATE AND UROLOGIC CANCERS  for your care. Our goal is always to provide you with excellent care. Hearing back from our patients is one way we can continue to improve our services. Please take a few minutes to complete the written survey that you may receive in the mail after your visit with us. Thank you!             Your Updated Medication List - Protect others around you: Learn how to safely use, store and throw away your medicines at www.disposemymeds.org.          This list is accurate as of: 3/8/17  2:56 PM.  Always use your most recent med list.                   Brand Name Dispense Instructions for use    HYDROcodone-acetaminophen 5-325 MG per tablet    NORCO    20 tablet    Take 1-2 tablets by mouth every 4 hours as needed for moderate to severe pain       hydrocortisone 25 MG Suppository    ANUSOL-HC    28 suppository    Place 1 suppository (25 mg) rectally 2 times daily       polyethylene " glycol powder    MIRALAX/GLYCOLAX    238 g    Take 17 g (1 capful) by mouth daily Mix 1 capful in 8 ounces of water, juice or soda.  To prevent constipation while taking narcotic  pain medication.  Follow with 8 ounces of water.  Discontinue  if you have loose stools or when you are no longer taking narcotics.       venlafaxine 37.5 MG 24 hr capsule    EFFEXOR-XR    90 capsule    Take 1 capsule (37.5 mg) by mouth daily

## 2017-03-08 NOTE — NURSING NOTE
Chief Complaint   Patient presents with     Surgical Followup     mid urethral sling ([facility]oplast Altis) and cystoscopy     Sriram GARCIA

## 2018-01-25 ENCOUNTER — OFFICE VISIT (OUTPATIENT)
Dept: OBGYN | Facility: CLINIC | Age: 53
End: 2018-01-25
Attending: OBSTETRICS & GYNECOLOGY
Payer: COMMERCIAL

## 2018-01-25 VITALS
DIASTOLIC BLOOD PRESSURE: 69 MMHG | HEIGHT: 69 IN | BODY MASS INDEX: 23.77 KG/M2 | WEIGHT: 160.5 LBS | SYSTOLIC BLOOD PRESSURE: 105 MMHG | HEART RATE: 50 BPM

## 2018-01-25 DIAGNOSIS — Z23 NEED FOR PROPHYLACTIC VACCINATION AND INOCULATION AGAINST INFLUENZA: ICD-10-CM

## 2018-01-25 DIAGNOSIS — K62.5 HEMORRHAGE OF RECTUM AND ANUS: ICD-10-CM

## 2018-01-25 DIAGNOSIS — Z00.00 VISIT FOR PREVENTIVE HEALTH EXAMINATION: Primary | ICD-10-CM

## 2018-01-25 DIAGNOSIS — Z12.4 SCREENING FOR CERVICAL CANCER: ICD-10-CM

## 2018-01-25 PROCEDURE — G0145 SCR C/V CYTO,THINLAYER,RESCR: HCPCS | Performed by: OBSTETRICS & GYNECOLOGY

## 2018-01-25 PROCEDURE — G0463 HOSPITAL OUTPT CLINIC VISIT: HCPCS | Mod: 25

## 2018-01-25 PROCEDURE — 25000128 H RX IP 250 OP 636: Mod: ZF

## 2018-01-25 PROCEDURE — G0008 ADMIN INFLUENZA VIRUS VAC: HCPCS | Mod: ZF

## 2018-01-25 PROCEDURE — 87624 HPV HI-RISK TYP POOLED RSLT: CPT | Performed by: OBSTETRICS & GYNECOLOGY

## 2018-01-25 PROCEDURE — 90686 IIV4 VACC NO PRSV 0.5 ML IM: CPT | Mod: ZF

## 2018-01-25 RX ORDER — HYDROCORTISONE ACETATE 25 MG/1
25 SUPPOSITORY RECTAL 2 TIMES DAILY
Qty: 28 SUPPOSITORY | Refills: 3 | Status: SHIPPED | OUTPATIENT
Start: 2018-01-25 | End: 2020-05-11

## 2018-01-25 NOTE — MR AVS SNAPSHOT
After Visit Summary   1/25/2018    Yelena Jacob    MRN: [id number]           Patient Information     Date Of Birth          1965        Visit Information        Provider Department      1/25/2018 8:00 AM Sarah Dillon MD [facility]        Today's Diagnoses     Visit for preventive health examination    -  1    Hemorrhage of rectum and anus        Need for prophylactic vaccination and inoculation against influenza        Screening for cervical cancer          Care Instructions      PREVENTIVE HEALTH RECOMMENDATIONS:   Most women need a yearly breast and pelvic exam.    A PAP screen, a test done DURING a pelvic exam, is NO longer recommended yearly.    March 2013, screening guidelines recommended by ACOG for PAP screen are:    1) First pap at age 21.    2) Pap every 3 years until age 30.    3) After age 30, pap every 3 years or Pap with HR HPV screen every 5 years until age 65.  4) Women do NOT need a vaginal Pap screen after a hysterectomy (surgical removal of the uterus) when they have not had cancer.    Exceptions:  1) Yearly pap if HIV+ or immunosuppressed secondary to organ transplant  2) ROSARIO II-III continue routine screening for 20 years.    I encourage you continue looking for opportunities to choose a healthy lifestyle:       * Choose to eat a heart healthy diet. Check out the FOOD PLATE guidelines at: http://www.choosemyplate.gov/ for helpful hints on weight and cholesterol management.  Balance your caloric intake with exercise to maintain a BMI in the 22 to 26 range. For bone health: Eat calcium-rich foods like yogurt, broccoli or take chewable calcium pills (500 to 600 mg) twice a day with food.       * Exercise for at least an average of 30 minutes a day, 5 days of the week. This will help you control your weight, release stress, and help prevent disease.      * Take a Vitamin D3 supplement daily fall through spring and during summer unless you  lwrh18-33' full body sun exposure to skin without sunscreen.      * DO wear sunscreen to prevent skin cancer after the first 15-30 minutes.      * Identify stressors in your life, find ways to release the stress, and, make time for yourself. PLEASE ask for help if mood changes last longer than two weeks.     * Limit alcohol to one drink per day.  No smoking.  Avoid second hand smoke. If you smoke, ask for help to stop.       *  If you are in a sexual relationship, talk with your partner about possible infection risks and take action to protect yourself from exposure to a sexual infection.    Please request an infection screen for STIs (sexually transmitted infections) if you are less than age 26 OR believe that you may be at risk.     Get a flu shot each year. Get a tetanus shot every 10 years. EVERYONE needs a pertussis (Whooping cough) booster.    See your dentist twice a year for an exam and preventive care cleaning.     Consider the following screen tests:    1) cholesterol test every 5 years.     2) yearly mammogram after age 40 unless you have identified risks.    3) colonoscopy every 10 years after age 50 unless you have identified risks.    4) diabetes blood test screening if you are at risk for diabetes.      Additional information that you may also find helpful:  The Internet now gives us access to LOTS of information -- some of it helpful, research documented and also plenty of harmful, anecdotal information that may not pertain to your situtaion. Consider visiting the following websites for accurate health information:    www.vitamindcouncil.org/ : Info and ongoing research re Vitamin D    www.fairview.org : Up to date and easily searchable information on multiple topics.    www.medlineplus.gov : medication info, interactive tutorials, watch real surgeries online    www.cdc.gov : public health info, travel advisories, epidemics (H1N1)    www.alie/std.org: current research re diagnosis, treatment and  prevention of sexually contacted infections.    www.health.state.[place] : [place] dept of heat, public health issues in [place], N1N1    www.familydoctor.org : good info from the Academy of Family Physicians                Follow-ups after your visit        Follow-up notes from your care team     Return in 1 year (on 1/25/2019) for Preventative Health Visit.      Your next 10 appointments already scheduled     Feb 05, 2018  1:30 PM CST   (Arrive by 1:15 PM)   MA SCREENING DIGITAL BILATERAL with [id number]   [facility] ([facility])    [address] [place] [address], [address]  [facility] [address] [phone]           Do not use any powder, lotion or deodorant under your arms or on your breast. If you do, we will ask you to remove it before your exam.  Wear comfortable, two-piece clothing.  If you have any allergies, tell your care team.  Bring any previous mammograms from other facilities or have them mailed to the breast center.              Future tests that were ordered for you today     Open Future Orders        Priority Expected Expires Ordered    MA Screening Digital Bilateral Routine  1/24/2019 1/24/2018            Who to contact     Please call your clinic at [phone] to:    Ask questions about your health    Make or cancel appointments    Discuss your medicines    Learn about your test results    Speak to your doctor   If you have compliments or concerns about an experience at your clinic, or if you wish to file a complaint, please contact [facility] Patient Relations at [phone] or email us at [email or web address]         Additional Information About Your Visit        Newsgrape Information     Newsgrape is an electronic gateway that provides easy, online access to your medical records. With Newsgrape, you can request a clinic appointment, read your test results, renew a prescription or communicate with your care team.     To sign up for Newsgrape visit the  "website at [email or web address]   You will be asked to enter the access code listed below, as well as some personal information. Please follow the directions to create your username and password.     Your access code is: [email or web address]  Expires: 2018  6:30 AM     Your access code will  in 90 days. If you need help or a new code, please contact your [facility] or call [phone] for assistance.        Care EveryWhere ID     This is your Care EveryWhere ID. This could be used by other organizations to access your [place] medical records  [id number]        Your Vitals Were     Pulse Height Last Period BMI (Body Mass Index)          50 1.753 m (5' 9\") 2017 23.7 kg/m2         Blood Pressure from Last 3 Encounters:   18 105/69   17 125/68   17 111/69    Weight from Last 3 Encounters:   18 72.8 kg (160 lb 8 oz)   17 70.3 kg (155 lb)   17 70.3 kg (155 lb)              We Performed the Following     HC FLU VAC PRESRV FREE QUAD SPLIT VIR 3+YRS IM     HPV High Risk Types DNA Cervical     Obtaining, preparing and conveyance of cervical or vaginal smear to laboratory.     Pap imaged thin layer screen with HPV - recommended age 30 - 65 years (select HPV order below)          Where to get your medicines      These medications were sent to [facility] [address] AT [address] [address]    Hours:  24-hours Phone:  [phone]     hydrocortisone 25 MG Suppository          Primary Care Provider    None Specified       No primary provider on file.        Equal Access to Services     KAVITA PLASENCIA : Hadkendall Leroy, hilario salomon, luba sifuentes. So [facility] [phone].    ATENCIÓN: Si habla español, tiene a rubin disposición servicios gratuitos de asistencia lingüística. Llame al [phone].    We comply " with applicable federal civil rights laws and [place] laws. We do not discriminate on the basis of race, color, national origin, age, disability, sex, sexual orientation, or gender identity.            Thank you!     Thank you for choosing [facility]  for your care. Our goal is always to provide you with excellent care. Hearing back from our patients is one way we can continue to improve our services. Please take a few minutes to complete the written survey that you may receive in the mail after your visit with us. Thank you!             Your Updated Medication List - Protect others around you: Learn how to safely use, store and throw away your medicines at www.disposemymeds.org.          This list is accurate as of 1/25/18  1:15 PM.  Always use your most recent med list.                   Brand Name Dispense Instructions for use Diagnosis    hydrocortisone 25 MG Suppository    ANUSOL-HC    28 suppository    Place 1 suppository (25 mg) rectally 2 times daily    Hemorrhage of rectum and anus

## 2018-01-25 NOTE — LETTER
2/6/2018         Yelena Radford   [address]  [place] [address]        Dear Ms. Radford:    The results of your recent pap and HPV co-testing are normal.  Your next pap is due in 5 years.    Results for orders placed or performed in visit on 01/25/18   Pap imaged thin layer screen with HPV - recommended age 30 - 65 years (select HPV order below)   Result Value Ref Range    PAP NIL     Copath Report         Patient Name: YELENA RADFORD  MR#: [id number]  Specimen #: [id number]  Collected: 1/25/2018  Received: 1/26/2018  Reported: 1/29/2018 14:47  Ordering Phy(s): JOIE NASH    For improved result formatting, select 'View Enhanced Report Format' under   Linked Documents section.    SPECIMEN/STAIN PROCESS:  Pap imaged thin layer prep screening (Surepath, FocalPoint with guided   screening)       Pap-Cyto x 1, HPV ordered x 1    SOURCE: Cervical, endocervical  ----------------------------------------------------------------   Pap imaged thin layer prep screening (Surepath, FocalPoint with guided   screening)  SPECIMEN ADEQUACY:  Satisfactory for evaluation.  -Transformation zone component present.    CYTOLOGIC INTERPRETATION:    Negative for intraepithelial lesion or malignancy         Other Non-Neoplastic Findings:  -Inflammation present.    Electronically signed out by:  DIOGENES Morel  (ASCP)    Processed and screened at [facility]    CLINICAL HISTORY:  LMP: 12/26/2017  Previous ASC-US  Date of Last Pap: 10/25/2016,    Papanicolaou Test Limitations:  Cervical cytology is a screening test with   limited sensitivity; regular  screening is critical for cancer prevention; Pap tests are primarily   effective for the diagnosis/prevention of  squamous cell carcinoma, not adenocarcinomas or other cancers.    TESTING LAB LOCATION:  [facility], [address]  [place]  [address]  [phone]    COLLECTION SITE:  Client:  [facility], [place]  Location: [facility] (B)       HPV High Risk Types DNA Cervical   Result Value Ref Range    HPV Source SurePath     HPV 16 DNA Negative NEG^Negative    HPV 18 DNA Negative NEG^Negative    Other HR HPV Negative NEG^Negative    Final Diagnosis This patient's sample is negative for HPV DNA.     Specimen Description Cervical Cells          Please note that test explanations are brief and do not reflect all diagnostic uses.  If you have any questions or concerns, please call the clinic at [phone].      Sincerely,      Sarah Dillon MD

## 2018-01-25 NOTE — NURSING NOTE
FLU VACCINE QUESTIONNAIRE:   Ask the following questions of all parties who want influenza vaccination:     CONTRAINDICATIONS   1. Is the patient age less than 6 months? NO   2. Has the person to be vaccinated ever had Guillain-Industry syndrome? NO   3. Has the person to be vaccinated had the vaccine this year? NO   4. Is the person to be vaccinated sick today? NO   5. Does the person to be vaccinated have an allergy to eggs or a component of the vaccine? NO   6. Has the person to vaccinated ever had a serious reaction to an influenza vaccination in the past? NO     INFLUENZA VACCINATION NOTE   Information sheet given to patient and questions answered.

## 2018-01-25 NOTE — PATIENT INSTRUCTIONS

## 2018-01-25 NOTE — LETTER
1/25/2018     RE: Yelena Jacob  [address]  [place] [address]     Dear Colleague,    Thank you for referring your patient, Yelena Jacob, to the [facility] at [facility]. Please see a copy of my visit note below.          Progress Note    SUBJECTIVE:  Yelena Jacob is an 52 year old perimenopausal , who requests an Annual Preventive Exam.  Periods are irregular, vary in flow, minimal vasomotor symptoms.  They have been this way for quite some time with no recent changes.  She is doing well today with no concerns.  She has a mammogram appointment scheduled as she doesn't have time to stay today.  She would like a flu shot and a shingles vaccine.  Her pap last year was ASCUS, negative HPV.  Reviewed the guidelines-pap co-testing in 3 years.  She would like a pap today if possible.        Concerns today include: none    Menstrual History:  Menstrual History 10/25/2016 10/25/2016 1/25/2018   LAST MENSTRUAL PERIOD 9/15/2016 - 12/26/2017   Menarche age - 14 -   Period Cycle (Days) - very irregular  -   Period Duration (Days) - 3 days -   Method of Contraception - None -   Period Pattern - Irregular -   Menstrual Flow - Moderate -   Menstrual Control - Tampon -   Dysmenorrhea - None -   Reviewed Today - Yes -       Last    Lab Results   Component Value Date    PAP ASC-US 10/25/2016     History of abnormal Pap smear: NO - age 30-65 PAP every 5 years with negative HPV co-testing recommended.  ASCUS with negative HPV-repeat with co-testing in 3 years.     Last   Lab Results   Component Value Date    HPV16 Negative 10/25/2016     Last   Lab Results   Component Value Date    HPV18 Negative 10/25/2016     Last   Lab Results   Component Value Date    HRHPV Negative 10/25/2016       Mammogram current: yes, follow up is scheduled  Last Mammogram:   Ma Screening Digital Bilateral    Result Date: 11/1/2016  Narrative: Examination: Bilateral digital  screening mammography with computer aided detection Comparison: 2014, 10/8/2012 History: No symptoms, routine screening. BREAST DENSITY: Extremely dense. COMMENTS: There has been no significant change.        Last Colonoscopy:  No results found for this or any previous visit.      HISTORY:    No current outpatient prescriptions on file prior to visit.  No current facility-administered medications on file prior to visit.   No Known Allergies  Immunization History   Administered Date(s) Administered     Influenza Vaccine IM 3yrs+ 4 Valent IIV4 10/25/2016, 2018       Obstetric History       T0      L3     SAB0   TAB0   Ectopic0   Multiple0   Live Births0      Past Medical History:   Diagnosis Date     Family history of early CAD      No active medical problems      Skin cancer of chest, excluding breast     S/P Mohs procedure     Past Surgical History:   Procedure Laterality Date     BACK SURGERY       COLONOSCOPY  2015     CYSTOSCOPY N/A 2017    Procedure: CYSTOSCOPY;  Surgeon: Bahman Noe MD;  Location: [facility] OR      TOOTH EXTRACTION W/FORCEP       LEG SURGERY      Titanium sri in leg     Family History   Problem Relation Age of Onset     HEART DISEASE Paternal Grandfather      HEART DISEASE Father      Social History     Social History     Marital status:      Spouse name: N/A     Number of children: N/A     Years of education: N/A     Occupational History           Social History Main Topics     Smoking status: Never Smoker     Smokeless tobacco: Never Used     Alcohol use Yes      Comment: 2 glasses of wine per night     Drug use: No     Sexual activity: Yes     Partners: Male     Birth control/ protection: None     Other Topics Concern     Parent/Sibling W/ Cabg, Mi Or Angioplasty Before 65f 55m? Yes     Social History Narrative    How much exercise per week? 4-5 days    How much calcium per day? diet       How much caffeine per day? 1 large coffee    How  "much vitamin D per day? diet    Do you/your family wear seatbelts?  Yes    Do you/your family use safety helmets? Yes    Do you/your family use sunscreen? Yes    Do you/your family keep firearms in the home? No    Do you/your family have a smoke detector(s)? Yes        Do you feel safe in your home? Yes    Has anyone ever touched you in an unwanted manner? No     Explain     Zayda Light, [facility]    04/14/15               ROS  [unfilled]  PHQ-9 SCORE 4/14/2015 10/25/2016   Total Score 4 -   Total Score - 4     ARLINE-7 SCORE 10/25/2016   Total Score 5 (mild anxiety)         EXAM:  Blood pressure 105/69, pulse 50, height 1.753 m (5' 9\"), weight 72.8 kg (160 lb 8 oz), last menstrual period 12/26/2017. Body mass index is 23.7 kg/(m^2).  General - pleasant female in no acute distress.  Skin - no suspicious lesions or rashes  EENT-  PERRLA, euthyroid with out palpable nodules  Neck - supple without lymphadenopathy.  Lungs - clear to auscultation bilaterally.  Heart - regular rate and rhythm without murmur.  Abdomen - soft, nontender, nondistended, no masses or organomegaly noted.  Musculoskeletal - no gross deformities.  Neurological - normal strength, sensation, and mental status.    Breast Exam:  Breast: Without visible skin changes. No dimpling or lesions seen.   Breasts supple, non-tender with palpation, no dominant mass, nodularity, or nipple discharge noted bilaterally. Axillary nodes negative.      Pelvic Exam:  EG/BUS: Normal genital architecture without lesions, erythema or abnormal secretions Bartholin's, Urethra, [place]'s normal   Urethral meatus: normal   Urethra: no masses, tenderness, or scarring   Bladder: no masses or tenderness   Vagina: moist, pink, rugae with creamy, white and odorless  secretions  Cervix: Multiparous, and no lesions  Uterus: midposition, and small, smooth, firm, mobile w/o pain  Adnexa: Within normal limits and No masses, nodularity, tenderness  Rectum:anus normal       ASSESSMENT:  Encounter " Diagnoses   Name Primary?     Hemorrhage of rectum and anus      Visit for preventive health examination Yes     Need for prophylactic vaccination and inoculation against influenza      Screening for cervical cancer       We do not have the current shingles vaccine at this time-she will check local pharmacies or ask when she returns for her mammogram.    PLAN:   Orders Placed This Encounter   Procedures     Obtaining, preparing and conveyance of cervical or vaginal smear to laboratory.     HC FLU VAC PRESRV FREE QUAD SPLIT VIR 3+YRS IM     Pap imaged thin layer screen with HPV - recommended age 30 - 65 years (select HPV order below)     HPV High Risk Types DNA Cervical     Orders Placed This Encounter   Medications     hydrocortisone (ANUSOL-HC) 25 MG Suppository     Sig: Place 1 suppository (25 mg) rectally 2 times daily     Dispense:  28 suppository     Refill:  3       Additional teaching done at this visit regarding perimenopause.    Return to clinic in one year.  Follow-up as needed.    Sarah Dillon MD, FACOG

## 2018-01-25 NOTE — PROGRESS NOTES
Progress Note    SUBJECTIVE:  Yelena Jacob is an 52 year old perimenopausal , who requests an Annual Preventive Exam.  Periods are irregular, vary in flow, minimal vasomotor symptoms.  They have been this way for quite some time with no recent changes.  She is doing well today with no concerns.  She has a mammogram appointment scheduled as she doesn't have time to stay today.  She would like a flu shot and a shingles vaccine.  Her pap last year was ASCUS, negative HPV.  Reviewed the guidelines-pap co-testing in 3 years.  She would like a pap today if possible.        Concerns today include: none    Menstrual History:  Menstrual History 10/25/2016 10/25/2016 1/25/2018   LAST MENSTRUAL PERIOD 9/15/2016 - 12/26/2017   Menarche age - 14 -   Period Cycle (Days) - very irregular  -   Period Duration (Days) - 3 days -   Method of Contraception - None -   Period Pattern - Irregular -   Menstrual Flow - Moderate -   Menstrual Control - Tampon -   Dysmenorrhea - None -   Reviewed Today - Yes -       Last    Lab Results   Component Value Date    PAP ASC-US 10/25/2016     History of abnormal Pap smear: NO - age 30-65 PAP every 5 years with negative HPV co-testing recommended.  ASCUS with negative HPV-repeat with co-testing in 3 years.     Last   Lab Results   Component Value Date    HPV16 Negative 10/25/2016     Last   Lab Results   Component Value Date    HPV18 Negative 10/25/2016     Last   Lab Results   Component Value Date    HRHPV Negative 10/25/2016       Mammogram current: yes, follow up is scheduled  Last Mammogram:   Ma Screening Digital Bilateral    Result Date: 11/1/2016  Narrative: Examination: Bilateral digital screening mammography with computer aided detection Comparison: 2/4/2014, 10/8/2012 History: No symptoms, routine screening. BREAST DENSITY: Extremely dense. COMMENTS: There has been no significant change.        Last Colonoscopy:  No results found for this or any previous  visit.      HISTORY:    No current outpatient prescriptions on file prior to visit.  No current facility-administered medications on file prior to visit.   No Known Allergies  Immunization History   Administered Date(s) Administered     Influenza Vaccine IM 3yrs+ 4 Valent IIV4 10/25/2016, 2018       Obstetric History       T0      L3     SAB0   TAB0   Ectopic0   Multiple0   Live Births0      Past Medical History:   Diagnosis Date     Family history of early CAD      No active medical problems      Skin cancer of chest, excluding breast     S/P Mohs procedure     Past Surgical History:   Procedure Laterality Date     BACK SURGERY       COLONOSCOPY  2015     CYSTOSCOPY N/A 2017    Procedure: CYSTOSCOPY;  Surgeon: Bahman Noe MD;  Location:  OR      TOOTH EXTRACTION W/FORCEP       LEG SURGERY      Titanium sri in leg     Family History   Problem Relation Age of Onset     HEART DISEASE Paternal Grandfather      HEART DISEASE Father      Social History     Social History     Marital status:      Spouse name: N/A     Number of children: N/A     Years of education: N/A     Occupational History           Social History Main Topics     Smoking status: Never Smoker     Smokeless tobacco: Never Used     Alcohol use Yes      Comment: 2 glasses of wine per night     Drug use: No     Sexual activity: Yes     Partners: Male     Birth control/ protection: None     Other Topics Concern     Parent/Sibling W/ Cabg, Mi Or Angioplasty Before 65f 55m? Yes     Social History Narrative    How much exercise per week? 4-5 days    How much calcium per day? diet       How much caffeine per day? 1 large coffee    How much vitamin D per day? diet    Do you/your family wear seatbelts?  Yes    Do you/your family use safety helmets? Yes    Do you/your family use sunscreen? Yes    Do you/your family keep firearms in the home? No    Do you/your family have a smoke detector(s)? Yes        Do  "you feel safe in your home? Yes    Has anyone ever touched you in an unwanted manner? No     Explain     Zayda Light, SHENA    04/14/15               ROS  [unfilled]  PHQ-9 SCORE 4/14/2015 10/25/2016   Total Score 4 -   Total Score - 4     ARLINE-7 SCORE 10/25/2016   Total Score 5 (mild anxiety)         EXAM:  Blood pressure 105/69, pulse 50, height 1.753 m (5' 9\"), weight 72.8 kg (160 lb 8 oz), last menstrual period 12/26/2017. Body mass index is 23.7 kg/(m^2).  General - pleasant female in no acute distress.  Skin - no suspicious lesions or rashes  EENT-  PERRLA, euthyroid with out palpable nodules  Neck - supple without lymphadenopathy.  Lungs - clear to auscultation bilaterally.  Heart - regular rate and rhythm without murmur.  Abdomen - soft, nontender, nondistended, no masses or organomegaly noted.  Musculoskeletal - no gross deformities.  Neurological - normal strength, sensation, and mental status.    Breast Exam:  Breast: Without visible skin changes. No dimpling or lesions seen.   Breasts supple, non-tender with palpation, no dominant mass, nodularity, or nipple discharge noted bilaterally. Axillary nodes negative.      Pelvic Exam:  EG/BUS: Normal genital architecture without lesions, erythema or abnormal secretions Bartholin's, Urethra, Ponderay's normal   Urethral meatus: normal   Urethra: no masses, tenderness, or scarring   Bladder: no masses or tenderness   Vagina: moist, pink, rugae with creamy, white and odorless  secretions  Cervix: Multiparous, and no lesions  Uterus: midposition, and small, smooth, firm, mobile w/o pain  Adnexa: Within normal limits and No masses, nodularity, tenderness  Rectum:anus normal       ASSESSMENT:  Encounter Diagnoses   Name Primary?     Hemorrhage of rectum and anus      Visit for preventive health examination Yes     Need for prophylactic vaccination and inoculation against influenza      Screening for cervical cancer       We do not have the current shingles vaccine at this " time-she will check local pharmacies or ask when she returns for her mammogram.    PLAN:   Orders Placed This Encounter   Procedures     Obtaining, preparing and conveyance of cervical or vaginal smear to laboratory.     HC FLU VAC PRESRV FREE QUAD SPLIT VIR 3+YRS IM     Pap imaged thin layer screen with HPV - recommended age 30 - 65 years (select HPV order below)     HPV High Risk Types DNA Cervical     Orders Placed This Encounter   Medications     hydrocortisone (ANUSOL-HC) 25 MG Suppository     Sig: Place 1 suppository (25 mg) rectally 2 times daily     Dispense:  28 suppository     Refill:  3       Additional teaching done at this visit regarding perimenopause.    Return to clinic in one year.  Follow-up as needed.    Sarah Dillon MD, FACOG

## 2018-01-29 LAB
COPATH REPORT: NORMAL
PAP: NORMAL

## 2018-01-31 LAB
FINAL DIAGNOSIS: NORMAL
HPV HR 12 DNA CVX QL NAA+PROBE: NEGATIVE
HPV16 DNA SPEC QL NAA+PROBE: NEGATIVE
HPV18 DNA SPEC QL NAA+PROBE: NEGATIVE
SPECIMEN DESCRIPTION: NORMAL
SPECIMEN SOURCE CVX/VAG CYTO: NORMAL

## 2018-02-05 ENCOUNTER — RADIANT APPOINTMENT (OUTPATIENT)
Dept: MAMMOGRAPHY | Facility: CLINIC | Age: 53
End: 2018-02-05
Payer: COMMERCIAL

## 2018-02-05 DIAGNOSIS — Z12.31 VISIT FOR SCREENING MAMMOGRAM: ICD-10-CM

## 2018-02-05 PROCEDURE — 77067 SCR MAMMO BI INCL CAD: CPT

## 2018-03-07 ENCOUNTER — TELEPHONE (OUTPATIENT)
Dept: OBGYN | Facility: CLINIC | Age: 53
End: 2018-03-07

## 2018-03-07 NOTE — TELEPHONE ENCOUNTER
Patient had wanted to be called when we have the new shingles vaccine which we do have. Left message for patient to call and see if she would still like it. It was not available at her annual with Dr. Dillon. Nurse said she could come for nurse visit to get it. Will await call back.

## 2019-04-02 ENCOUNTER — OFFICE VISIT (OUTPATIENT)
Dept: OBGYN | Facility: CLINIC | Age: 54
End: 2019-04-02
Attending: OBSTETRICS & GYNECOLOGY
Payer: COMMERCIAL

## 2019-04-02 VITALS
SYSTOLIC BLOOD PRESSURE: 124 MMHG | HEART RATE: 54 BPM | DIASTOLIC BLOOD PRESSURE: 79 MMHG | BODY MASS INDEX: 22.99 KG/M2 | HEIGHT: 69 IN | WEIGHT: 155.2 LBS

## 2019-04-02 DIAGNOSIS — F51.02 ADJUSTMENT INSOMNIA: ICD-10-CM

## 2019-04-02 DIAGNOSIS — K64.9 HEMORRHOIDS, UNSPECIFIED HEMORRHOID TYPE: ICD-10-CM

## 2019-04-02 DIAGNOSIS — Z00.00 PREVENTATIVE HEALTH CARE: Primary | ICD-10-CM

## 2019-04-02 PROCEDURE — G0463 HOSPITAL OUTPT CLINIC VISIT: HCPCS | Mod: ZF

## 2019-04-02 RX ORDER — HYDROCORTISONE ACETATE 25 MG/1
25 SUPPOSITORY RECTAL 2 TIMES DAILY
Qty: 24 SUPPOSITORY | Refills: 3 | Status: SHIPPED | OUTPATIENT
Start: 2019-04-02 | End: 2020-05-11

## 2019-04-02 RX ORDER — ZOLPIDEM TARTRATE 5 MG/1
5 TABLET ORAL
Qty: 20 TABLET | Refills: 0 | Status: SHIPPED | OUTPATIENT
Start: 2019-04-02 | End: 2019-05-02

## 2019-04-02 ASSESSMENT — ANXIETY QUESTIONNAIRES
GAD7 TOTAL SCORE: 2
5. BEING SO RESTLESS THAT IT IS HARD TO SIT STILL: NOT AT ALL
1. FEELING NERVOUS, ANXIOUS, OR ON EDGE: SEVERAL DAYS
6. BECOMING EASILY ANNOYED OR IRRITABLE: SEVERAL DAYS
3. WORRYING TOO MUCH ABOUT DIFFERENT THINGS: NOT AT ALL
7. FEELING AFRAID AS IF SOMETHING AWFUL MIGHT HAPPEN: NOT AT ALL
2. NOT BEING ABLE TO STOP OR CONTROL WORRYING: NOT AT ALL

## 2019-04-02 ASSESSMENT — PATIENT HEALTH QUESTIONNAIRE - PHQ9
5. POOR APPETITE OR OVEREATING: NOT AT ALL
SUM OF ALL RESPONSES TO PHQ QUESTIONS 1-9: 7

## 2019-04-02 ASSESSMENT — MIFFLIN-ST. JEOR: SCORE: 1373.36

## 2019-04-02 NOTE — LETTER
"2019       RE: Yelena Jacob  [address]  [facility] [address]     Dear Colleague,    Thank you for referring your patient, Yelena Jacob, to the [facility] at [facility]. Please see a copy of my visit note below.    [facility]  Annual Exam    HPI:    Yelena Jacob is a 53 year old , here for a well woman exam. Her concerns today include insomnia and her hemorrhoids. She states her insomnia is likely due to frequent travel to [facility] with her job. She is flying there about every other month (6 times per year) and stays for about 10 days. There is an 8 hour time difference. She has taken melatonin in the past and this has worked well for her when transitioning, but states there are times when it does not work. She desires a medication for \"emergencies\" when she is unable to sleep while traveling or on planes. She does not believe this is due to other concerns such as anxiety or depression. She did have recent stress in her family life, but states this has resolved.    Her other concern today is her hemorrhoids, she has had them for awhile and uses hydrocortisone suppositories. She gets good relief with the suppositories but states they are rather expensive and is curious about cheaper options. She does not have significant pain with these, mostly  Itching.     GYN History  - LMP: About 6 months ago per patient, has had irregular menses the past couple of years. No episodes of bleeding for hte past 6 months.  - Pap Smears: NILM, HPV neg (2018), history of ASCUS     Lab Results   Component Value Date    PAP NIL 2018    PAP ASC-US 10/25/2016     - Contraception: Condoms, IUD in the past  - Sexual Activity: Male   - Sexual Concerns: No concerns  - Hx STIs: No personal history,  has genital herpes and patient uses condoms  - Hx UTIs: Hx of incontinence, resolved after sling procedure    OBHx  Obstetric History     " "  T0      L3     SAB0   TAB0   Ectopic0   Multiple0   Live Births0       # Outcome Date GA Lbr Angelito/2nd Weight Sex Delivery Anes PTL Lv   4 AB            3 Para            2 Para            1 Para                 Past Medical Hx:  - Skin Cancer of chest (due to follow up with derm)    Past Surgical Hx:  - Leg surgery- titanium sri in leg ()  - Cystoscopy ()  - Colonoscopy ()  - 2 Back surgery (, )    Medications:  - Annusol    Allergies:  - No known allergies    Social Hx:   -  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    Family Hx:   - Early CAD (father,  at age 55)    ROS: 10-Point ROS negative except as noted in HPI    Preventative Health  Current or historical sexual, physical or mental abuse: Denies  Feelings of depression: Denies  Diet: Reports balanced diet  Exercise: Daily, running, crossfit    Physical Exam  /79 (BP Location: Right arm, Patient Position: Chair)   Pulse 54   Ht 1.753 m (5' 9\")   Wt 70.4 kg (155 lb 3.2 oz)   Breastfeeding? No   BMI 22.92 kg/m     Gen: Well-appearing, NAD  HEENT: Normocephalic, atraumatic  Neck: Thyroid is not enlarged, no appreciable masses palpated. Non-tender  CV:  RRR, no m/r/g auscultated  Pulm: CTAB, no w/r/r auscultated  Abd: Soft, non-tender, non-distended  Ext: No LE edema, extremities warm and well perfused  Breast: Symmetric, no nipple discharge or retraction, no axillary lymphadenopathy, no palpable nodules or masses bilaterally  Pelvic:  Normal appearing external female genitalia. Normal hair distribution. Uterus is small, mobile, non-tender. No adnexal tenderness or masses  Current BMI: Body mass index is 22.92 kg/m . (normal weight)    Assessment/Plan  Yelena Jacob is a 53 year old  female here for annual exam.    #Insomnia  - Will try 5 mg Ambien to help with sleeping while traveling. Recommended starting with 2.5 mg dose.    #Hemorrhoids  - Refilled hydrocortisone suppository prescription, " additionally added a cream. Patient may choose which ever is cheaper/works better for her    #CV risk assessment  - Patient very concerned about her family history of CAD disease  - Discussed that patient currently does not have additional risk factors  - 10 year risk of CV event is <1%  - Will repeat Lipid Profile today, otherwise no additional recommendations for screening    #Routine Health Maintenance:   - Labs- Cholesterol, DUE now  - Colonoscopy (last 10/13/2015), next due 10/2020  - Mammogram (last 2/5/2018), DUE now  - Pap Smear (last 1/25/2018, NILM HPV neg)    Follow Up: In 1 year, or sooner if needed.    Patient discussed and staffed with Dr. Barcenas.    Cristin Mayen MD  OB/GYN PGY-1  04/02/19 10:10 AM     Appreciate note by Dr. Mayen. Patient has been seen and examined by me with the resident, agree with above note.     Chantal Barcenas MD

## 2019-04-02 NOTE — PROGRESS NOTES
"[email or web address]  Annual Exam    HPI:    Yelena Jacob is a 53 year old , here for a well woman exam. Her concerns today include insomnia and her hemorrhoids. She states her insomnia is likely due to frequent travel to [facility] with her job. She is flying there about every other month (6 times per year) and stays for about 10 days. There is an 8 hour time difference. She has taken melatonin in the past and this has worked well for her when transitioning, but states there are times when it does not work. She desires a medication for \"emergencies\" when she is unable to sleep while traveling or on planes. She does not believe this is due to other concerns such as anxiety or depression. She did have recent stress in her family life, but states this has resolved.    Her other concern today is her hemorrhoids, she has had them for awhile and uses hydrocortisone suppositories. She gets good relief with the suppositories but states they are rather expensive and is curious about cheaper options. She does not have significant pain with these, mostly  Itching.     GYN History  - LMP: About 6 months ago per patient, has had irregular menses the past couple of years. No episodes of bleeding for hte past 6 months.  - Pap Smears: NILM, HPV neg (2018), history of ASCUS     Lab Results   Component Value Date    PAP NIL 2018    PAP ASC-US 10/25/2016     - Contraception: Condoms, IUD in the past  - Sexual Activity: Male   - Sexual Concerns: No concerns  - Hx STIs: No personal history,  has genital herpes and patient uses condoms  - Hx UTIs: Hx of incontinence, resolved after sling procedure    OBHx  Obstetric History       T0      L3     SAB0   TAB0   Ectopic0   Multiple0   Live Births0       # Outcome Date GA Lbr Angelito/2nd Weight Sex Delivery Anes PTL Lv   4 AB            3 Para            2 Para            1 Para                 Past Medical Hx:  - Skin Cancer of chest (due to follow up with " "derm)    Past Surgical Hx:  - Leg surgery- titanium sri in leg ()  - Cystoscopy (2017)  - Colonoscopy ()  - 2 Back surgery (, )    Medications:  - Annusol    Allergies:  - No known allergies    Social Hx:   -  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 No   BMI 22.92 kg/m    Gen: Well-appearing, NAD  HEENT: Normocephalic, atraumatic  Neck: Thyroid is not enlarged, no appreciable masses palpated. Non-tender  CV:  RRR, no m/r/g auscultated  Pulm: CTAB, no w/r/r auscultated  Abd: Soft, non-tender, non-distended  Ext: No LE edema, extremities warm and well perfused  Breast: Symmetric, no nipple discharge or retraction, no axillary lymphadenopathy, no palpable nodules or masses bilaterally  Pelvic:  Normal appearing external female genitalia. Normal hair distribution. Uterus is small, mobile, non-tender. No adnexal tenderness or masses  Current BMI: Body mass index is 22.92 kg/m . (normal weight)    Assessment/Plan  Yelena Jacob is a 53 year old  female here for annual exam.    #Insomnia  - Will try 5 mg Ambien to help with sleeping while traveling. Recommended starting with 2.5 mg dose.    #Hemorrhoids  - Refilled hydrocortisone suppository prescription, additionally added a cream. Patient may choose which ever is cheaper/works better for her    #CV risk assessment  - Patient very concerned about her family history of CAD disease  - Discussed that patient currently does not have additional risk factors  - 10 year risk of CV event is <1%  - Will " repeat Lipid Profile today, otherwise no additional recommendations for screening    #Routine Health Maintenance:   - Labs- Cholesterol, DUE now  - Colonoscopy (last 10/13/2015), next due 10/2020  - Mammogram (last 2/5/2018), DUE now  - Pap Smear (last 1/25/2018, NILM HPV neg)    Follow Up: In 1 year, or sooner if needed.    Patient discussed and staffed with Dr. Barcenas.    Cristin Mayen MD  OB/GYN PGY-1  04/02/19 10:10 AM     Appreciate note by Dr. Mayen. Patient has been seen and examined by me with the resident, agree with above note.     Chantal Barcenas MD

## 2019-04-02 NOTE — NURSING NOTE
Chief Complaint   Patient presents with     Physical     Annual and discuss insomnia and hemmorhoids.       See SHENA Quintero 4/2/2019

## 2019-04-03 ASSESSMENT — ANXIETY QUESTIONNAIRES: GAD7 TOTAL SCORE: 2

## 2019-05-02 DIAGNOSIS — F51.02 ADJUSTMENT INSOMNIA: ICD-10-CM

## 2019-05-02 RX ORDER — ZOLPIDEM TARTRATE 5 MG/1
5 TABLET ORAL
Qty: 20 TABLET | Refills: 0 | COMMUNITY
Start: 2019-05-02 | End: 2024-05-17

## 2019-05-02 NOTE — TELEPHONE ENCOUNTER
Discussed Ambien refill request with Dr Barcenas in clinic and she approved another 20 -5 mg tabs be refilled. Spoke to Pharmacist at [facility] in [place], [address] [place] [address] to give  telephone refill.

## 2019-05-28 ENCOUNTER — TELEPHONE (OUTPATIENT)
Dept: OBGYN | Facility: CLINIC | Age: 54
End: 2019-05-28

## 2019-05-28 NOTE — TELEPHONE ENCOUNTER
PRIOR AUTHORIZATION DENIED    Medication: zolpidem (AMBIEN) 5 MG tablet    Denial Date: 5/28/2019    Denial Rational: Compounds are not covered on patient's plan.        Appeal Information:

## 2019-05-28 NOTE — TELEPHONE ENCOUNTER
Central Prior Authorization Team   Phone: [phone]    [place] Initiation    Medication: zolpidem (AMBIEN) 5 MG tablet  Insurance Company: [employer] [place] - Phone [phone] Fax [phone]  Pharmacy Filling the Rx: [facility] [address] [place] [address] AT [facility]  Filling Pharmacy Phone: [phone]  Filling Pharmacy Fax:    Start Date: 5/28/2019

## 2019-05-28 NOTE — TELEPHONE ENCOUNTER
Prior Authorization Retail Medication Request    Medication/Dose: zolpidem 5mg tablet   Take one tablet by mouth once every night at bedtime as needed  ICD code (if different than what is on RX):  F51.02  Previously Tried and Failed:  unisom - didn't help her sleep, melatonin-  Worked at first but not with traveling- which she does for work  Rationale:  Unable to sleep when she travels- which she does frequently for work.    Insurance Name:  [facility]  Insurance ID:  [id number]      Pharmacy Information (if different than what is on RX)  [facility]  Phone:  [phone]

## 2019-05-29 NOTE — TELEPHONE ENCOUNTER
Prior Authorization Approval    Authorization Effective Date: 05/17/2019 (called and had backdated)  Authorization Expiration Date: 5/27/2021  Medication: zolpidem (AMBIEN) 5 MG tablet - P/A APPROVED  Approved Dose/Quantity: 20  Reference #:     Insurance Company: [employer] [place] - Phone [phone] Fax [phone]  Expected CoPay:       CoPay Card Available:      Foundation Assistance Needed:    Which Pharmacy is filling the prescription (Not needed for infusion/clinic administered): [facility] [address] [place] [address] AT [facility]  Pharmacy Notified: Yes - spoke to pharmacy who says patient has paid cash price on $38.69 on 05/17/19. I have called insurance and had authorization backdated to 05/17/19, pharmacy notified. They will contact patient for a refund of the difference.  Patient Notified:

## 2020-03-23 ENCOUNTER — OFFICE VISIT (OUTPATIENT)
Dept: OBGYN | Facility: CLINIC | Age: 55
End: 2020-03-23
Attending: OBSTETRICS & GYNECOLOGY
Payer: COMMERCIAL

## 2020-03-23 DIAGNOSIS — F90.9 ATTENTION DEFICIT HYPERACTIVITY DISORDER (ADHD), UNSPECIFIED ADHD TYPE: ICD-10-CM

## 2020-03-23 DIAGNOSIS — F41.8 DEPRESSION WITH ANXIETY: Primary | ICD-10-CM

## 2020-03-23 RX ORDER — LISDEXAMFETAMINE DIMESYLATE 40 MG/1
40 CAPSULE ORAL EVERY MORNING
Qty: 90 CAPSULE | Refills: 0 | Status: SHIPPED | OUTPATIENT
Start: 2020-03-23 | End: 2020-03-23

## 2020-03-23 RX ORDER — LISDEXAMFETAMINE DIMESYLATE 40 MG/1
40 CAPSULE ORAL EVERY MORNING
Qty: 90 CAPSULE | Refills: 0 | Status: SHIPPED | OUTPATIENT
Start: 2020-03-23 | End: 2023-10-12

## 2020-03-23 NOTE — TELEPHONE ENCOUNTER
Pharmacy left message that lisdexamfetamine (VYVANSE) 40 MG capsule cannot be called in. Must be hard copy signed/faxed or e-prescribed. Dr. Collado is working remotely today. Will email copy for her to sign and nurse will fax to pharmacy.

## 2020-03-23 NOTE — LETTER
"3/23/2020       RE: Yelena Jacob  [address]  [facility] [address]     Dear Colleague,    Thank you for referring your patient, Yelena Jacob, to the [facility] at [facility]. Please see a copy of my visit note below.    Yelena Jacob is a 54 year old female who is being evaluated via a billable telephone visit.      The patient has been notified of following:     \"This telephone visit will be conducted via a call between you and your physician/provider. We have found that certain health care needs can be provided without the need for a physical exam.  This service lets us provide the care you need with a short phone conversation.  If a prescription is necessary we can send it directly to your pharmacy.  If lab work is needed we can place an order for that and you can then stop by our lab to have the test done at a later time.    If during the course of the call the physician/provider feels a telephone visit is not appropriate, you will not be charged for this service.\"     Yelena Jacob is a 54 year old . Her last preventative OB/GYN exam was 2019.  She has a hx of insomnia due to frequent travel to [facility] and has been given Ambien in the past. She was given 20- 5 mg Ambien last May 2019.    Today she states she has a hx of anxiety and depression with some recurrence during this time of Covid 19. She used an old vyvanse prescription and felt better.  She has a hx of ADHD.  She previously had this filled in [place] by her PCP. She is requesting a refill.    She is also concerned that she has had a dry cough for 1 week, no fever or SOB; not worse at night. She has a sore throat.  No recent travel; no house members who are sick.    I have reviewed and updated the patient's Past Medical History, Social History, Family History and Medication List.    ALLERGIES  Patient has no known allergies.       Assessment/Plan:  1. " Depression/anxiety; hx ofAttention deficit hyperactivity disorder (ADHD), unspecified ADHD type  She is requesting refill of her old medication.  I told her that under normal circumstances this is not a medicine that I prescribed.  However under these circumstances with cold bed, I will give her 90 days.  In the interim I have advised that she establish care with an internist.  I did tell her that internal medicine is available in our clinic.    - lisdexamfetamine (VYVANSE) 40 MG capsule; Take 1 capsule (40 mg) by mouth every morning  Dispense: 90 capsule; Refill: 0    2. COUGH  I have advised that she monitor her symptoms and to call if she experiences fever or shortness of breath or other more concerning symptoms. Hand washing and covering cough was urged.      Phone call duration:  10 minutes    Chance Collado MD

## 2020-03-23 NOTE — PROGRESS NOTES
"Yelena Jacob is a 54 year old female who is being evaluated via a billable telephone visit.      The patient has been notified of following:     \"This telephone visit will be conducted via a call between you and your physician/provider. We have found that certain health care needs can be provided without the need for a physical exam.  This service lets us provide the care you need with a short phone conversation.  If a prescription is necessary we can send it directly to your pharmacy.  If lab work is needed we can place an order for that and you can then stop by our lab to have the test done at a later time.    If during the course of the call the physician/provider feels a telephone visit is not appropriate, you will not be charged for this service.\"     Yelena Jacob is a 54 year old . Her last preventative OB/GYN exam was 2019.  She has a hx of insomnia due to frequent travel to [facility] and has been given Ambien in the past. She was given 20- 5 mg Ambien last May 2019.    Today she states she has a hx of anxiety and depression with some recurrence during this time of Covid 19. She used an old vyvanse prescription and felt better.  She has a hx of ADHD.  She previously had this filled in [place] by her PCP. She is requesting a refill.    She is also concerned that she has had a dry cough for 1 week, no fever or SOB; not worse at night. She has a sore throat.  No recent travel; no house members who are sick.    I have reviewed and updated the patient's Past Medical History, Social History, Family History and Medication List.    ALLERGIES  Patient has no known allergies.       Assessment/Plan:  1. Depression/anxiety; hx ofAttention deficit hyperactivity disorder (ADHD), unspecified ADHD type  She is requesting refill of her old medication.  I told her that under normal circumstances this is not a medicine that I prescribed.  However under these circumstances with cold bed, I will give her 90 days.  In " the interim I have advised that she establish care with an internist.  I did tell her that internal medicine is available in our clinic.    - lisdexamfetamine (VYVANSE) 40 MG capsule; Take 1 capsule (40 mg) by mouth every morning  Dispense: 90 capsule; Refill: 0    2. COUGH  I have advised that she monitor her symptoms and to call if she experiences fever or shortness of breath or other more concerning symptoms. Hand washing and covering cough was urged.      Phone call duration:  10 minutes    Chance Collado MD

## 2020-05-11 ENCOUNTER — OFFICE VISIT (OUTPATIENT)
Dept: INTERNAL MEDICINE | Facility: CLINIC | Age: 55
End: 2020-05-11
Attending: INTERNAL MEDICINE
Payer: COMMERCIAL

## 2020-05-11 VITALS
SYSTOLIC BLOOD PRESSURE: 111 MMHG | HEART RATE: 56 BPM | HEIGHT: 69 IN | BODY MASS INDEX: 23.52 KG/M2 | WEIGHT: 158.8 LBS | DIASTOLIC BLOOD PRESSURE: 72 MMHG

## 2020-05-11 DIAGNOSIS — H02.403 ACQUIRED PTOSIS OF EYELID OF BOTH EYES: Primary | ICD-10-CM

## 2020-05-11 PROCEDURE — G0463 HOSPITAL OUTPT CLINIC VISIT: HCPCS | Mod: ZF

## 2020-05-11 ASSESSMENT — MIFFLIN-ST. JEOR: SCORE: 1384.69

## 2020-05-11 NOTE — LETTER
2020       RE: Yelena Jacob  [address]  [facility] [address]     Dear Colleague,    Thank you for referring your patient, Yelena Jacob, to the [facility]  at [facility]. Please see a copy of my visit note below.    [facility]   [facility]  3RD FLR,[address]  [address]  [address]  [facility] [id number] [phone]  Dept: [phone]    PRE-OP EVALUATION:  Today's date: 2020    Yelena Jacob (: 1965) presents for pre-operative evaluation assessment as requested by Dr. Sarmiento.  She requires evaluation and anesthesia risk assessment prior to undergoing surgery/procedure for treatment of eyelid prosis.    Proposed Surgery/ Procedure: bilateral blepharoplasty  Date of Surgery/ Procedure: 2020  Time of Surgery/ Procedure: 7:30 Am  Hospital/Surgical Facility: [facility]  Fax number for surgical facility: [phone]  Primary Physician: Ethel Smalls  Type of Anesthesia Anticipated: Local with MAC    Patient has a Health Care Directive or Living Will:  NO    1. NO - Do you have a history of heart attack, stroke, stent, bypass or surgery on an artery in the head, neck, heart or legs?  2. NO - Do you ever have any pain or discomfort in your chest?  3. NO - Do you have a history of  Heart Failure?  4. NO - Are you troubled by shortness of breath when: walking on the level, up a slight hill or at night?  5. NO - Do you currently have a cold, bronchitis or other respiratory infection?  6. NO - Do you have a cough, shortness of breath or wheezing?  7. NO - Do you sometimes get pains in the calves of your legs when you walk?  8. NO - Do you or anyone in your family have previous history of blood clots?  9. NO - Do you or does anyone in your family have a serious bleeding problem such as prolonged bleeding following surgeries or cuts?  10. NO - Have you ever had problems  with anemia or been told to take iron pills?  11. NO - Have you had any abnormal blood loss such as black, tarry or bloody stools, or abnormal vaginal bleeding?  12. NO - Have you ever had a blood transfusion?  13. NO - Have you or any of your relatives ever had problems with anesthesia?  14. NO - Do you have sleep apnea, excessive snoring or daytime drowsiness?  15. NO - Do you have any prosthetic heart valves?  16. NO - Do you have prosthetic joints?  17. NO - Is there any chance that you may be pregnant?      HPI:     HPI related to upcoming procedure: Patient reports that she has been dealing with eyelid ptosis for some time. She has been advised on treatment options and opted for surgical correctioin. She is currently scheduled for surgery in June      See problem list for active medical problems.  Problems all longstanding and stable, except as noted/documented.  See ROS for pertinent symptoms related to these conditions.      MEDICAL HISTORY:     Patient Active Problem List    Diagnosis Date Noted     Female stress incontinence 09/28/2016     Priority: Medium      Past Medical History:   Diagnosis Date     Family history of early CAD      No active medical problems      Skin cancer of chest, excluding breast     S/P Mohs procedure     Past Surgical History:   Procedure Laterality Date     BACK SURGERY       COLONOSCOPY  9-1-2015     CYSTOSCOPY N/A 1/17/2017    Procedure: CYSTOSCOPY;  Surgeon: Bahman Noe MD;  Location:  OR      TOOTH EXTRACTION W/FORCEP       LEG SURGERY      Titanium sri in leg     Current Outpatient Medications   Medication Sig Dispense Refill     lisdexamfetamine (VYVANSE) 40 MG capsule Take 1 capsule (40 mg) by mouth every morning 90 capsule 0     zolpidem (AMBIEN) 5 MG tablet Take 1 tablet (5 mg) by mouth nightly as needed for sleep 20 tablet 0     OTC products: None, except as noted above    No Known Allergies   Latex Allergy: NO    Social History     Tobacco Use     Smoking  "status: Never Smoker     Smokeless tobacco: Never Used   Substance Use Topics     Alcohol use: Yes     Comment: 2 glasses of wine per night     History   Drug Use No       REVIEW OF SYSTEMS:   CONSTITUTIONAL: NEGATIVE for fever, chills, change in weight  INTEGUMENTARY/SKIN: NEGATIVE for worrisome rashes, moles or lesions  EYES: NEGATIVE for vision changes or irritation  ENT/MOUTH: NEGATIVE for ear, mouth and throat problems  RESP: NEGATIVE for significant cough or SOB  BREAST: NEGATIVE for masses, tenderness or discharge  CV: NEGATIVE for chest pain, palpitations or peripheral edema  GI: NEGATIVE for nausea, abdominal pain, heartburn, or change in bowel habits  : NEGATIVE for frequency, dysuria, or hematuria  MUSCULOSKELETAL: NEGATIVE for significant arthralgias or myalgia  NEURO: NEGATIVE for weakness, dizziness or paresthesias  ENDOCRINE: NEGATIVE for temperature intolerance, skin/hair changes  HEME: NEGATIVE for bleeding problems  PSYCHIATRIC: NEGATIVE for changes in mood or affect    EXAM:   /72 (BP Location: Left arm, Patient Position: Chair)   Pulse 56   Ht 1.753 m (5' 9\")   Wt 72 kg (158 lb 12.8 oz)   LMP 12/26/2017   Breastfeeding No   BMI 23.45 kg/m      GENERAL APPEARANCE: healthy, alert and no distress     EYES: EOMI, PERRL     HENT: ear canals and TM's normal     NECK: no adenopathy, no asymmetry, masses, or scars and thyroid normal to palpation     RESP: lungs clear to auscultation - no rales, rhonchi or wheezes     CV: regular rates and rhythm, normal S1 S2, no S3 or S4 and no murmur, click or rub     ABDOMEN:  soft, nontender, no HSM or masses and bowel sounds normal     MS: extremities normal- no gross deformities noted, no evidence of inflammation in joints, FROM in all extremities.     SKIN: no suspicious lesions or rashes     NEURO: Normal strength and tone, sensory exam grossly normal, mentation intact and speech normal     PSYCH: mentation appears normal. and affect normal/bright   "   LYMPHATICS: No cervical adenopathy    DIAGNOSTICS:   No labs or EKG required for low risk surgery (cataract, skin procedure, breast biopsy, etc)    No results for input(s): HGB, PLT, INR, NA, POTASSIUM, CR, A1C in the last 78686 hours.     IMPRESSION:   Reason for surgery/procedure: eyelid ptosis  Diagnosis/reason for consult: preop evaluaiton.     The proposed surgical procedure is considered LOW risk.    REVISED CARDIAC RISK INDEX  The patient has the following serious cardiovascular risks for perioperative complications such as (MI, PE, VFib and 3  AV Block):  No serious cardiac risks  INTERPRETATION: 1 risks: Class II (low risk - 0.9% complication rate)    The patient has the following additional risks for perioperative complications:  No identified additional risks      ICD-10-CM    1. Acquired ptosis of eyelid of both eyes  H02.403        RECOMMENDATIONS:         --Patient is on no chronic medications    APPROVAL GIVEN to proceed with proposed procedure, without further diagnostic evaluation       Signed Electronically by: Ethel Smalls MD    Copy of this evaluation report is provided to requesting physician.    [facility] Preop Guidelines    Revised Cardiac Risk Index    Again, thank you for allowing me to participate in the care of your patient.      Sincerely,    Ethel Smalls MD

## 2020-05-11 NOTE — PROGRESS NOTES
[facility]   [facility]  3RD FLR,[address]  [address]  [facility]  [facility] [id number] [phone]  Dept: [phone]    PRE-OP EVALUATION:  Today's date: 2020    Yelena Jacob (: 1965) presents for pre-operative evaluation assessment as requested by Dr. Sarmiento.  She requires evaluation and anesthesia risk assessment prior to undergoing surgery/procedure for treatment of eyelid prosis.    Proposed Surgery/ Procedure: bilateral blepharoplasty  Date of Surgery/ Procedure: 2020  Time of Surgery/ Procedure: 7:30 Am  Hospital/Surgical Facility: [facility]  Fax number for surgical facility: [phone]  Primary Physician: Ethel Smalls  Type of Anesthesia Anticipated: Local with MAC    Patient has a Health Care Directive or Living Will:  NO    1. NO - Do you have a history of heart attack, stroke, stent, bypass or surgery on an artery in the head, neck, heart or legs?  2. NO - Do you ever have any pain or discomfort in your chest?  3. NO - Do you have a history of  Heart Failure?  4. NO - Are you troubled by shortness of breath when: walking on the level, up a slight hill or at night?  5. NO - Do you currently have a cold, bronchitis or other respiratory infection?  6. NO - Do you have a cough, shortness of breath or wheezing?  7. NO - Do you sometimes get pains in the calves of your legs when you walk?  8. NO - Do you or anyone in your family have previous history of blood clots?  9. NO - Do you or does anyone in your family have a serious bleeding problem such as prolonged bleeding following surgeries or cuts?  10. NO - Have you ever had problems with anemia or been told to take iron pills?  11. NO - Have you had any abnormal blood loss such as black, tarry or bloody stools, or abnormal vaginal bleeding?  12. NO - Have you ever had a blood transfusion?  13. NO - Have you or any of your relatives ever had problems with anesthesia?  14.  NO - Do you have sleep apnea, excessive snoring or daytime drowsiness?  15. NO - Do you have any prosthetic heart valves?  16. NO - Do you have prosthetic joints?  17. NO - Is there any chance that you may be pregnant?      HPI:     HPI related to upcoming procedure: Patient reports that she has been dealing with eyelid ptosis for some time. She has been advised on treatment options and opted for surgical correctioin. She is currently scheduled for surgery in June      See problem list for active medical problems.  Problems all longstanding and stable, except as noted/documented.  See ROS for pertinent symptoms related to these conditions.      MEDICAL HISTORY:     Patient Active Problem List    Diagnosis Date Noted     Female stress incontinence 09/28/2016     Priority: Medium      Past Medical History:   Diagnosis Date     Family history of early CAD      No active medical problems      Skin cancer of chest, excluding breast     S/P Mohs procedure     Past Surgical History:   Procedure Laterality Date     BACK SURGERY       COLONOSCOPY  9-1-2015     CYSTOSCOPY N/A 1/17/2017    Procedure: CYSTOSCOPY;  Surgeon: Bahman Noe MD;  Location:  OR      TOOTH EXTRACTION W/FORCEP       LEG SURGERY      Titanium sri in leg     Current Outpatient Medications   Medication Sig Dispense Refill     lisdexamfetamine (VYVANSE) 40 MG capsule Take 1 capsule (40 mg) by mouth every morning 90 capsule 0     zolpidem (AMBIEN) 5 MG tablet Take 1 tablet (5 mg) by mouth nightly as needed for sleep 20 tablet 0     OTC products: None, except as noted above    No Known Allergies   Latex Allergy: NO    Social History     Tobacco Use     Smoking status: Never Smoker     Smokeless tobacco: Never Used   Substance Use Topics     Alcohol use: Yes     Comment: 2 glasses of wine per night     History   Drug Use No       REVIEW OF SYSTEMS:   CONSTITUTIONAL: NEGATIVE for fever, chills, change in weight  INTEGUMENTARY/SKIN: NEGATIVE for  "worrisome rashes, moles or lesions  EYES: NEGATIVE for vision changes or irritation  ENT/MOUTH: NEGATIVE for ear, mouth and throat problems  RESP: NEGATIVE for significant cough or SOB  BREAST: NEGATIVE for masses, tenderness or discharge  CV: NEGATIVE for chest pain, palpitations or peripheral edema  GI: NEGATIVE for nausea, abdominal pain, heartburn, or change in bowel habits  : NEGATIVE for frequency, dysuria, or hematuria  MUSCULOSKELETAL: NEGATIVE for significant arthralgias or myalgia  NEURO: NEGATIVE for weakness, dizziness or paresthesias  ENDOCRINE: NEGATIVE for temperature intolerance, skin/hair changes  HEME: NEGATIVE for bleeding problems  PSYCHIATRIC: NEGATIVE for changes in mood or affect    EXAM:   /72 (BP Location: Left arm, Patient Position: Chair)   Pulse 56   Ht 1.753 m (5' 9\")   Wt 72 kg (158 lb 12.8 oz)   LMP 12/26/2017   Breastfeeding No   BMI 23.45 kg/m      GENERAL APPEARANCE: healthy, alert and no distress     EYES: EOMI, PERRL     HENT: ear canals and TM's normal     NECK: no adenopathy, no asymmetry, masses, or scars and thyroid normal to palpation     RESP: lungs clear to auscultation - no rales, rhonchi or wheezes     CV: regular rates and rhythm, normal S1 S2, no S3 or S4 and no murmur, click or rub     ABDOMEN:  soft, nontender, no HSM or masses and bowel sounds normal     MS: extremities normal- no gross deformities noted, no evidence of inflammation in joints, FROM in all extremities.     SKIN: no suspicious lesions or rashes     NEURO: Normal strength and tone, sensory exam grossly normal, mentation intact and speech normal     PSYCH: mentation appears normal. and affect normal/bright     LYMPHATICS: No cervical adenopathy    DIAGNOSTICS:   No labs or EKG required for low risk surgery (cataract, skin procedure, breast biopsy, etc)    No results for input(s): HGB, PLT, INR, NA, POTASSIUM, CR, A1C in the last 57313 hours.     IMPRESSION:   Reason for surgery/procedure: " eyelid ptosis  Diagnosis/reason for consult: preop evaluaiton.     The proposed surgical procedure is considered LOW risk.    REVISED CARDIAC RISK INDEX  The patient has the following serious cardiovascular risks for perioperative complications such as (MI, PE, VFib and 3  AV Block):  No serious cardiac risks  INTERPRETATION: 1 risks: Class II (low risk - 0.9% complication rate)    The patient has the following additional risks for perioperative complications:  No identified additional risks      ICD-10-CM    1. Acquired ptosis of eyelid of both eyes  H02.403        RECOMMENDATIONS:         --Patient is on no chronic medications    APPROVAL GIVEN to proceed with proposed procedure, without further diagnostic evaluation       Signed Electronically by: Ethel Smalls MD    Copy of this evaluation report is provided to requesting physician.    [facility] Preop Guidelines    Revised Cardiac Risk Index

## 2020-05-15 ENCOUNTER — TELEPHONE (OUTPATIENT)
Dept: OBGYN | Facility: CLINIC | Age: 55
End: 2020-05-15

## 2020-05-15 NOTE — TELEPHONE ENCOUNTER
Spoke with [facility] pharmacy where script was sent. She did not fill t his rx as there is no generic alternative and out of pocket cost is $1200.      Also noted by Dr. Collado that Yelena was to establish with PCP for any additional refills.    She did see Dr. Smalls for a preop.  She will establish with Dr. Smalls.   Appt made for 05/18/2020 via phone as she did not mention medication at preop            ----- Message from Chana Chaidez sent at 5/15/2020 11:00 AM CDT -----  Regarding: lisdexamfetamine (VYVANSE) 40 MG capsule  Hello!  Alison calling to report that her insurance company rejected the lisdexamfetamine (VYVANSE) 40 MG capsule and are wanting her to use the generic form of this medication.  She is wondering Dr. Collado would be willing to prescribe her the generic form.  Please call Yelena back to discuss.    Thank you!  Chana NAQVI    Please DO NOT send this message and/or reply back to sender. Call Center Representatives DO NOT respond to messages.

## 2020-05-15 NOTE — TELEPHONE ENCOUNTER
Spoke with Dr. Smalls who advised she does not prescribe stimulants for ADHD so would not be able to advise Yelena on what medication to try as alternative to vyvanse. She will check with Dr. Doe to see if she manages this. If not, pt should follow-up with psychiatry.    Spoke with Yelena and gave her message from Dr. Smalls. She indicated understanding and will await an answer about Dr. Doe.    Cancelling phone visit with Dr. Smalls 5/18.    Forwarding to Dr. Doe.

## 2020-05-18 NOTE — TELEPHONE ENCOUNTER
Miriam spoke with Yelena, please see phone message.  05/18/2020 appt cancelled as Dr. Smalls does not manage stimulants.      Will consider seeing Dr. Doe.

## 2020-05-22 NOTE — TELEPHONE ENCOUNTER
"Left message on voice mail to please return call to nursing at [phone].     Cem Doe MD Maahs, Donna L, RN 4 days ago      I would need to do a complete intake, need records, including diagnostic evaluation of prior physician who managed stimulant medication. Involve MTM with Erinn Upton, would be easiest.   If patient prepared to do this, I am willing to see her. Will not do this over [employer], over a \"quick phone call\"   Cem    Message text        "

## 2021-12-06 ENCOUNTER — TRANSFERRED RECORDS (OUTPATIENT)
Dept: HEALTH INFORMATION MANAGEMENT | Facility: CLINIC | Age: 56
End: 2021-12-06
Payer: COMMERCIAL

## 2023-08-31 ENCOUNTER — OFFICE VISIT (OUTPATIENT)
Dept: FAMILY MEDICINE | Facility: CLINIC | Age: 58
End: 2023-08-31
Payer: COMMERCIAL

## 2023-08-31 VITALS
OXYGEN SATURATION: 97 % | DIASTOLIC BLOOD PRESSURE: 74 MMHG | WEIGHT: 158.08 LBS | BODY MASS INDEX: 23.34 KG/M2 | TEMPERATURE: 98.2 F | SYSTOLIC BLOOD PRESSURE: 116 MMHG | HEART RATE: 55 BPM

## 2023-08-31 DIAGNOSIS — Z12.11 SCREENING FOR COLON CANCER: ICD-10-CM

## 2023-08-31 DIAGNOSIS — Z82.49 FAMILY HISTORY OF CORONARY ARTERY DISEASE: ICD-10-CM

## 2023-08-31 DIAGNOSIS — Z78.0 MENOPAUSE: Primary | ICD-10-CM

## 2023-08-31 DIAGNOSIS — Z12.31 ENCOUNTER FOR SCREENING MAMMOGRAM FOR MALIGNANT NEOPLASM OF BREAST: ICD-10-CM

## 2023-08-31 PROBLEM — G43.909 MIGRAINE: Status: ACTIVE | Noted: 2023-08-31

## 2023-08-31 PROBLEM — F32.A DEPRESSIVE DISORDER: Status: ACTIVE | Noted: 2023-08-31

## 2023-08-31 RX ORDER — DICLOFENAC SODIUM AND MISOPROSTOL 75; 200 MG/1; UG/1
1 TABLET, DELAYED RELEASE ORAL
COMMUNITY
Start: 2022-08-19 | End: 2023-10-12

## 2023-08-31 RX ORDER — LEVONORGESTREL 52 MG/1
1 INTRAUTERINE DEVICE INTRAUTERINE ONCE
COMMUNITY
End: 2023-08-31

## 2023-08-31 RX ORDER — LANOLIN ALCOHOL/MO/W.PET/CERES
3 CREAM (GRAM) TOPICAL
COMMUNITY
Start: 2023-02-02

## 2023-08-31 NOTE — PATIENT INSTRUCTIONS
[facility]   Address: [address] [place] [address]  Phone: [phone]

## 2023-08-31 NOTE — PROGRESS NOTES
Subjective:  Yelena Jacob is an 57 year old, , who requests an evaluation of radha/menopause symptoms.    Radha/menopause symptoms include:  Hot flashes (but improving), Night sweats (but has always had night sweats), Mild Insomnia, Vaginal dryness, Headaches/migraines with aura (but improving), Depression and/or anxiety, and Brain fog    No Vaginal pain/dyspareunia, Weight gain, Hair loss/thinning, Recurrent UTIs/dysuria, Incontinence, Low libido    Gynecologic History  Patient's last menstrual period was 2017.   Age of Menarche: 15    Current contraception: menopause    Last Pap smear: 2023  History of abnormal Pap smear: ASCUS 2016 - HPV negative    Obstetric History  OB History    Para Term  AB Living   4 3 3 0 1 3   SAB IAB Ectopic Multiple Live Births   0 1 0 0 3      # Outcome Date GA Lbr Angelito/2nd Weight Sex Delivery Anes PTL Lv   4 Term 04 39w1d  4.196 kg (9 lb 4 oz) M Vag-Spont EPI  DEMAR      Name: Lai   3 Term 02 40w0d  4.082 kg (9 lb) M Vag-Spont None  DEMAR      Name: Remberto   2 Term 99 40w0d  4.082 kg (9 lb) F Vag-Spont EPI  DEMAR      Name: Danitza Martinez IAB                 Labs:  TSH   Date Value Ref Range Status   2015 1.92 0.40 - 4.00 mU/L Final     Lab Results   Component Value Date    CHOL 159 2015     Lab Results   Component Value Date    HDL 68 2015     Lab Results   Component Value Date    LDL 82 2015     Lab Results   Component Value Date    TRIG 44 2015     Lab Results   Component Value Date    CHOLHDLRATIO 2.3 2015     Last fasting glucose: 77    Family History  No family history of breast, uterine, ovarian or colon cancer.    Objective  EXAM:  Blood pressure 116/74, pulse 55, temperature 98.2  F (36.8  C), weight 71.7 kg (158 lb 1.3 oz), last menstrual period 2017, SpO2 97 %, not currently breastfeeding. Body mass index is 23.34 kg/m .  General - pleasant female in no acute  distress.    ASSESSMENT:  Yelena Jacob is an 57 year old, , who requests an evaluation of asrah/menopause symptoms.    PLAN:  Orders Placed This Encounter   Procedures    Mammogram - routine screening    Colonoscopy Screening  Referral     -Reviewed risk and benefits of initiating systemic HT for bothersome VMS.   -Discussed risk versus benefit with patient in initiating systemic HT if age of 60 years or 10 years since LMP. Yelena Jacob is an 57 year old with Patient's last menstrual period was 2017.  -Reviewed non-hormonal options for bothersome VMS symptoms, including SSRI/SNRI initiation.  -Acupuncture, yoga, exercise, chiropractic work, and paced respirations may help to cope with VMS but have not shown evidence to alleviate them.   -Reviewed use of vaginal lubrication for GSM.  -Currently would like to hold off on any treatment options, but will reach out if she has questions.  -Mammogram & colonoscopy ordered for routine screening.  -Recommend scheduling with [facility]ovascular Disease Prevention d/t FH of CAD in father ( at age 55) and paternal grandfather ( at age 65).     Return to clinic in 2024 for annual physical, sooner Follow-up as needed.    Time note ((n3, 30'): The total time (on the date of service) for this service was 36 minutes, including discussion/face-to-face, chart review, interpretation not otherwise reported, documentation, and updating of the computerized record.      Marimar Bianchi MD

## 2023-09-06 ENCOUNTER — ANCILLARY PROCEDURE (OUTPATIENT)
Dept: MAMMOGRAPHY | Facility: CLINIC | Age: 58
End: 2023-09-06
Attending: FAMILY MEDICINE
Payer: COMMERCIAL

## 2023-09-06 DIAGNOSIS — Z12.31 ENCOUNTER FOR SCREENING MAMMOGRAM FOR MALIGNANT NEOPLASM OF BREAST: ICD-10-CM

## 2023-09-06 PROCEDURE — 77067 SCR MAMMO BI INCL CAD: CPT | Performed by: STUDENT IN AN ORGANIZED HEALTH CARE EDUCATION/TRAINING PROGRAM

## 2023-09-06 PROCEDURE — 77063 BREAST TOMOSYNTHESIS BI: CPT | Performed by: STUDENT IN AN ORGANIZED HEALTH CARE EDUCATION/TRAINING PROGRAM

## 2023-09-08 ENCOUNTER — TELEPHONE (OUTPATIENT)
Dept: GASTROENTEROLOGY | Facility: CLINIC | Age: 58
End: 2023-09-08
Payer: COMMERCIAL

## 2023-09-08 NOTE — TELEPHONE ENCOUNTER
"Endoscopy Scheduling Screen    Have you had a positive Covid test in the last 14 days?  No    Are you active on MyChart?   Yes    What insurance is in the chart?  Other:  BC[employer]     Ordering/Referring Provider:     JULIANA CIFUENTES       (If ordering provider performs procedure, schedule with ordering provider unless otherwise instructed. )    BMI: Estimated body mass index is 23.34 kg/m  as calculated from the following:    Height as of 5/11/20: 1.753 m (5' 9\").    Weight as of 8/31/23: 71.7 kg (158 lb 1.3 oz).     Sedation Ordered  moderate sedation.   If patient BMI > 50 do not schedule in [facility].    If patient BMI > 45 do not schedule at [facility].    Are you taking methadone or Suboxone?  No    Are you taking any prescription medications for pain 3 or more times per week?   No    Do you have a history of malignant hyperthermia or adverse reaction to anesthesia?  No    (Females) Are you currently pregnant?   No     Have you been diagnosed or told you have pulmonary hypertension?   No    Do you have an LVAD?  No    Have you been told you have moderate to severe sleep apnea?  No    Have you been told you have COPD, asthma, or any other lung disease?  No    Do you have any heart conditions?  No     Have you ever had an organ transplant?   No    Have you ever had or are you awaiting a heart or lung transplant?   No    Have you had a stroke or transient ischemic attack (TIA aka \"mini stroke\" in the last 6 months?   No    Have you been diagnosed with or been told you have cirrhosis of the liver?   No    Are you currently on dialysis?   No    Do you need assistance transferring?   No    BMI: Estimated body mass index is 23.34 kg/m  as calculated from the following:    Height as of 5/11/20: 1.753 m (5' 9\").    Weight as of 8/31/23: 71.7 kg (158 lb 1.3 oz).     Is patients BMI > 40 and scheduling location [facility]?  No    Do you take an injectable medication for weight loss or diabetes (excluding insulin)?  No    Do you take the " medication Naltrexone?  No    Do you take blood thinners?  No       Prep   Are you currently on dialysis or do you have chronic kidney disease?  No    Do you have a diagnosis of diabetes?  No    Do you have a diagnosis of cystic fibrosis (CF)?  No    On a regular basis do you go 3 -5 days between bowel movements?  No    BMI > 40?  No    Preferred Pharmacy:    [id number] - [place], [place] - [address] [facility]  [address] [address]  Phone: [phone] Fax: [phone]      Final Scheduling Details   Colonoscopy prep sent?  Standard MiraLAX    Procedure scheduled  Colonoscopy    Surgeon:  HOMA      Date of procedure:  12/18/2023     Pre-OP / PAC:   No - Not required for this site.    Location  [facility] - [facility] - Per order.    Sedation   Moderate Sedation - Per order.      Patient Reminders:   You will receive a call from a Nurse to review instructions and health history.  This assessment must be completed prior to your procedure.  Failure to complete the Nurse assessment may result in the procedure being cancelled.      On the day of your procedure, please designate an adult(s) who can drive you home stay with you for the next 24 hours. The medicines used in the exam will make you sleepy. You will not be able to drive.      You cannot take public transportation, ride share services, or non-medical taxi service without a responsible caregiver.  Medical transport services are allowed with the requirement that a responsible caregiver will receive you at your destination.  We require that drivers and caregivers are confirmed prior to your procedure.

## 2023-09-08 NOTE — TELEPHONE ENCOUNTER
RECORDS RECEIVED FROM:    DATE RECEIVED:    NOTES STATUS DETAILS   OFFICE NOTE from referring provider  Internal 8-31-23 (recommended Deedee) Dr. Bianchi   OFFICE NOTE from other cardiologists  N/A    RECORDS from hospital/ED N/A    MEDICATION LIST Internal    GENERAL CARDIO RECORDS   (ALL APPOINTMENT TYPES)     LABS (CBC,BMP,CMP, TSH) Internal    EKG (STRIPS & REPORTS) N/A    MONITORS (STRIPS & REPORTS) N/A    ECHOS (IMAGES AND REPORTS) N/A    STRESS TESTS (IMAGES AND REPORTS) N/A    cMRI (IMAGES AND REPORTS) N/A    CT/CTA (IMAGES AND REPORTS) N/A

## 2023-10-05 DIAGNOSIS — Z13.6 CARDIOVASCULAR SCREENING; LDL GOAL LESS THAN 100: Primary | ICD-10-CM

## 2023-10-12 ENCOUNTER — LAB (OUTPATIENT)
Dept: LAB | Facility: CLINIC | Age: 58
End: 2023-10-12
Payer: COMMERCIAL

## 2023-10-12 ENCOUNTER — OFFICE VISIT (OUTPATIENT)
Dept: CARDIOLOGY | Facility: CLINIC | Age: 58
End: 2023-10-12
Payer: COMMERCIAL

## 2023-10-12 VITALS
SYSTOLIC BLOOD PRESSURE: 109 MMHG | DIASTOLIC BLOOD PRESSURE: 66 MMHG | HEART RATE: 60 BPM | HEIGHT: 69 IN | WEIGHT: 155.9 LBS | BODY MASS INDEX: 23.09 KG/M2 | OXYGEN SATURATION: 97 %

## 2023-10-12 DIAGNOSIS — Z82.49 FAMILY HISTORY OF ISCHEMIC HEART DISEASE: ICD-10-CM

## 2023-10-12 DIAGNOSIS — Z13.6 CARDIOVASCULAR SCREENING; LDL GOAL LESS THAN 100: ICD-10-CM

## 2023-10-12 DIAGNOSIS — Z82.49 FAMILY HISTORY OF ISCHEMIC HEART DISEASE: Primary | ICD-10-CM

## 2023-10-12 LAB
APO A-I SERPL-MCNC: <6 MG/DL
CHOLEST SERPL-MCNC: 197 MG/DL
CREAT UR-MCNC: 22.3 MG/DL
CRP SERPL HS-MCNC: <0.15 MG/L
FASTING STATUS PATIENT QL REPORTED: NO
GLUCOSE SERPL-MCNC: 75 MG/DL (ref 70–99)
HDLC SERPL-MCNC: 84 MG/DL
LDLC SERPL CALC-MCNC: 102 MG/DL
MICROALBUMIN UR-MCNC: <12 MG/L
MICROALBUMIN/CREAT UR: NORMAL MG/G{CREAT}
NONHDLC SERPL-MCNC: 113 MG/DL
NT-PROBNP SERPL-MCNC: 106 PG/ML (ref 0–900)
TRIGL SERPL-MCNC: 56 MG/DL

## 2023-10-12 PROCEDURE — 83880 ASSAY OF NATRIURETIC PEPTIDE: CPT | Performed by: PATHOLOGY

## 2023-10-12 PROCEDURE — 82947 ASSAY GLUCOSE BLOOD QUANT: CPT | Performed by: PATHOLOGY

## 2023-10-12 PROCEDURE — 99000 SPECIMEN HANDLING OFFICE-LAB: CPT | Performed by: PATHOLOGY

## 2023-10-12 PROCEDURE — 80061 LIPID PANEL: CPT | Performed by: PATHOLOGY

## 2023-10-12 PROCEDURE — 82570 ASSAY OF URINE CREATININE: CPT | Performed by: NURSE PRACTITIONER

## 2023-10-12 PROCEDURE — 86141 C-REACTIVE PROTEIN HS: CPT | Performed by: NURSE PRACTITIONER

## 2023-10-12 PROCEDURE — 99215 OFFICE O/P EST HI 40 MIN: CPT | Mod: 25 | Performed by: NURSE PRACTITIONER

## 2023-10-12 PROCEDURE — 93922 UPR/L XTREMITY ART 2 LEVELS: CPT | Performed by: NURSE PRACTITIONER

## 2023-10-12 PROCEDURE — 83695 ASSAY OF LIPOPROTEIN(A): CPT | Performed by: NURSE PRACTITIONER

## 2023-10-12 PROCEDURE — 36415 COLL VENOUS BLD VENIPUNCTURE: CPT | Performed by: PATHOLOGY

## 2023-10-12 NOTE — PROGRESS NOTES
[facility] for Cardiovascular Disease Prevention - Exam Note    Active Problems   Patient Active Problem List    Diagnosis Date Noted    Depressive disorder 2023     Priority: Medium    Migraine 2023     Priority: Medium    ASCUS of cervix with negative high risk HPV 10/01/2016     Priority: Medium     Formatting of this note might be different from the original. 10/2016 ASCUS/HPV negative 2018 NIL/HPV negative 2023 NIL/HPV negative. Plan: Pap/HPV due 2028.         Reason For Visit   Patient here for [facility] early detection of atherosclerosis and CVD exam.    Pain Evaluation  Current history of pain associated with this visit is: denied    Cardiac risk factors:  - age    - smoking     - elevated BMI      + Family history CVD       - Diet           - Hypertension    HPI   Yelena Jacob is a 58 year old year old female with a history of reactive arthritis, ADD and a family history of ischemic heart disease.  Her father  while playing tennis and her paternal grandfather  playing golf. Her primary care provider is Dr. Marimar Sun at [facility]. 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  She has migraine headaches 1x/3 months. Today in clinic she denies chest pain at rest, with activity, while sleeping, SOB at rest, with activity or while sleeping, palpitations, lightheadedness, lower leg edema, calf cramps, indigestion, or issues with her memory.     Nutrition assessment per patient report:   Foods with fat/cholesterol (fried foods, fatty meats, junk food):   2x/month    Fruits and vegetables (  cup cooked, 1 cup raw):  1-3 servings of vegetables/day, 1-3 servings of fruit/day  Caffeine (1 cup coffee, soda, etc):  2 cups of coffee/day  Alcohol servings (12 oz. beer, 4 oz. wine, 1  oz. in mixed drink):   2 glasses of wine/day  Special dietary habits:   avoids sweets  Typical breakfast:  coffee                 Lunch: protein, toast,  salad                 Dinner: protein, vegetable, pasta, fish 1x/week                 Snacks: nuts, fruit, cheese                 Drinks:  water  Activity  Patient is active with crossfit 3-4x/week, walking 3-5 miles alternate with running every day,   Golf, hiking    Sleep pattern: good    Laboratory Results Review  We discussed laboratory results today including lipids targets and how foods influence cholesterol.    Weight  Her perceived healthy weight is 155-160 pounds.  A normal BMI of 25 is equal to 169 pounds.  The current BMI of 23.02 is normal weight range.      PMH   Past Medical History:   Diagnosis Date    Family history of early CAD     Female stress incontinence 09/28/2016    Reactive arthritis (H)     Skin cancer of chest, excluding breast     S/P Mohs procedure       PSH  Past Surgical History:   Procedure Laterality Date    BACK SURGERY      COLONOSCOPY  9-1-2015    CYSTOSCOPY N/A 1/17/2017    Procedure: CYSTOSCOPY;  Surgeon: Bahman Noe MD;  Location:  OR     TOOTH EXTRACTION W/FORCEP      LEG SURGERY      Titanium sri in leg       Current Meds   Current Outpatient Medications   Medication Sig Dispense Refill    melatonin 3 MG tablet Take 3 mg by mouth nightly as needed      zolpidem (AMBIEN) 5 MG tablet Take 1 tablet (5 mg) by mouth nightly as needed for sleep 20 tablet 0       Allergies    No Known Allergies    Family Hx   Family History   Problem Relation Age of Onset    Coronary Artery Disease Father 55        playing tennis    Cancer Maternal Grandmother     Alzheimer Disease Maternal Grandfather     Coronary Artery Disease Paternal Grandfather 65        playing golf       Social History    She is  with 3 children    Tobacco History  History   Smoking Status    Never   Smokeless Tobacco    Never       ROS  CONSTITUTIONAL:  No fever, chills, or sweats. No weight gain/loss.   EENT:  No visual disturbance, ear ache, epistaxis, sore throat  ALLERGIES/IMMUNOLOGIC:   "Negative  RESPIRATORY:  No cough, hemoptysis  CARDIOVASCULAR:  As per HPI  GI:  No nausea, vomiting, hematemesis, melena  :  No urinary frequency, dysuria, or hematuria  INTEGUMENT:  Negative  PSYCHIATRIC:  Negative  NEURO:  Negative  ENDOCRINE:  Negative  MUSCULOSKELETAL:  Negative     Vital Signs   LMP 12/26/2017       Waist: 30 inches  Hip: 38 inches    Physical Exam   In general, the patient is a pleasant female in no apparent distress   HEENT: NC/AT, PERRLA, EOMI, sclerae white, not injected. Nares clear, pharynx without erythema or exudate, dentition intact    Neck: No adenopathy, no thyromegaly, carotids +4/4 bilaterally without bruits,  no jugular venous distension   Lungs: Breath sounds clear bilaterally, without crackles, ronchi, or wheezes  Cor: RRR, S1S2 without murmur, rub, click, or gallop, the PMI is in the 5th ICS in the midclavicular line  Abdomen: Soft, nontender, nondistended, BS+ in all 4 quadrants, without hepatomegaly, no aorta or renal artery bruits  Extremities: No clubbing, cyanosis, or edema. DP and PT pulses +2/4 bilaterally    The 10-year ASCVD risk score (Venturafinn DHALIWAL Jr., et al., 2013) is: 1.5%  Values used to calculate the score:   Age: 58 year old   Sex: female   Is Non- : No   Diabetic: No   Tobacco smoker: No   Systolic Blood Press: 113 mmHg   Is BP treated: No   HDL Cholesterol: 84 mg/dL   Total Cholesterol: 197 mg/dL    Recent Labs  Lab Results   Component Value Date    GLC 75 10/12/2023      No results found for: \"NTBNP\"  No results found for: \"NTBNPI\"   No results found for: \"UCRR\"   No results found for: \"MICROL\"   No results found for: \"MICROALBUMIN\"   No results found for: \"CRP\"   Lab Results   Component Value Date    CHOL 197 10/12/2023    CHOL 159 04/14/2015      Lab Results   Component Value Date    TRIG 56 10/12/2023    TRIG 44 04/14/2015      Lab Results   Component Value Date    HDL 84 10/12/2023    HDL 68 04/14/2015      Lab Results   Component " Value Date     (H) 10/12/2023    LDL 82 04/14/2015      Lab Results   Component Value Date    VLDL 9 04/14/2015      Lab Results   Component Value Date    CHOLHDLRATIO 2.3 04/14/2015     Lab Results   Component Value Date    NHDL 113 10/12/2023        Assessment:    Cardiovascular:  Asymptomatic, she is not complaining of chest pain, EKG revealed SB, HR 55 bpm,  no plaque detected in carotid arteries, Recommend ordering a CAC to further investigate her risk of heart disease    Blood Pressure:  She does not take BP medication, -113/66-75 mmHg    Lipids:  She  does not take a statin medication, check Lp(a)   Latest Ref Rng 10/12/2023  12:37 PM   BP WT CHOL     Cholesterol <200 mg/dL 197    HDL Cholesterol >=50 mg/dL 84    LDL Cholesterol Calculated <=100 mg/dL 102 (H)    Triglycerides <150 mg/dL 56      Glucose: 75    Sleep pattern:  Sleep hygiene reviewed during clinic visit, handout given to patient    Weight Management: BMI 23.02    Return to Clinic: 5 years    Health Habit Summary:  Nutrition: Heart Healthy Eating:  most of the time   Exercise:  regularly active  Weight:  normal weight range  Tobacco Use:  never used    This case was presented to Dr. Quiroga and Dr. Evangelist Hobson during our weekly conference.     60 minutes spent on the date of the encounter doing (chart review/review of outside records/review of test results/interpretation of tests/patient visit/documentation/discussion with other provider(s)   TWILA Davidson CNP       CC  Patient Care Team:  Marimar Cifuentes MD as PCP - General (Family Medicine)  Shereen Garland MD (Inactive) as Referring Physician (OB/Gyn)  Nico Lomeli MD as MD (Gastroenterology)  Bahman Noe MD as MD (Urology)  Regina Alan, RN as Registered Nurse (Urology)  Sarah Dillon MD as MD (OB/Gyn)  Chantal Barcenas MD as MD (OB/Gyn)  Lissette Méndez APRN CNP as Nurse Practitioner (Cardiovascular Disease)  MARIMAR CIFUENTES

## 2023-10-12 NOTE — LETTER
10/12/2023      RE: Yelena Jacob  [address]  [facility] [address]       Dear Colleague,    Thank you for the opportunity to participate in the care of your patient, Yelena Jacob, at the [facility] FOR CARDIOVASCULAR DISEASE PREVENT[facility]. Please see a copy of my visit note below.      [facility] Cardiovascular Disease Prevention - Exam Note    Active Problems   Patient Active Problem List    Diagnosis Date Noted    Depressive disorder 2023     Priority: Medium    Migraine 2023     Priority: Medium    ASCUS of cervix with negative high risk HPV 10/01/2016     Priority: Medium     Formatting of this note might be different from the original. 10/2016 ASCUS/HPV negative 2018 NIL/HPV negative 2023 NIL/HPV negative. Plan: Pap/HPV due 2028.         Reason For Visit   Patient here for [facility] early detection of atherosclerosis and CVD exam.    Pain Evaluation  Current history of pain associated with this visit is: denied    Cardiac risk factors:  - age    - smoking     - elevated BMI      + Family history CVD       - Diet           - Hypertension    HPI   Yelena Jacob is a 58 year old year old female with a history of reactive arthritis, ADD and a family history of ischemic heart disease.  Her father  while playing tennis and her paternal grandfather  playing golf. Her primary care provider is Dr. Marimar Sun at [facility]. 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  She has migraine headaches 1x/3 months. Today in clinic she denies chest pain at rest, with activity, while sleeping, SOB at rest, with activity or while sleeping, palpitations, lightheadedness, lower leg edema, calf cramps, indigestion, or issues with her memory.     Nutrition assessment per patient report:   Foods with fat/cholesterol (fried foods, fatty meats, junk  food):   2x/month    Fruits and vegetables (  cup cooked, 1 cup raw):  1-3 servings of vegetables/day, 1-3 servings of fruit/day  Caffeine (1 cup coffee, soda, etc):  2 cups of coffee/day  Alcohol servings (12 oz. beer, 4 oz. wine, 1  oz. in mixed drink):   2 glasses of wine/day  Special dietary habits:   avoids sweets  Typical breakfast:  coffee                 Lunch: protein, toast, salad                 Dinner: protein, vegetable, pasta, fish 1x/week                 Snacks: nuts, fruit, cheese                 Drinks:  water  Activity  Patient is active with [employer] 3-4x/week, walking 3-5 miles alternate with running every day,   Golf, hiking    Sleep pattern: good    Laboratory Results Review  We discussed laboratory results today including lipids targets and how foods influence cholesterol.    Weight  Her perceived healthy weight is 155-160 pounds.  A normal BMI of 25 is equal to 169 pounds.  The current BMI of 23.02 is normal weight range.      PMH   Past Medical History:   Diagnosis Date    Family history of early CAD     Female stress incontinence 09/28/2016    Reactive arthritis (H)     Skin cancer of chest, excluding breast     S/P Mohs procedure       PSH  Past Surgical History:   Procedure Laterality Date    BACK SURGERY      COLONOSCOPY  9-1-2015    CYSTOSCOPY N/A 1/17/2017    Procedure: CYSTOSCOPY;  Surgeon: Bahman Noe MD;  Location:  OR     TOOTH EXTRACTION W/FORCEP      LEG SURGERY      Titanium sri in leg       Current Meds   Current Outpatient Medications   Medication Sig Dispense Refill    melatonin 3 MG tablet Take 3 mg by mouth nightly as needed      zolpidem (AMBIEN) 5 MG tablet Take 1 tablet (5 mg) by mouth nightly as needed for sleep 20 tablet 0       Allergies    No Known Allergies    Family Hx   Family History   Problem Relation Age of Onset    Coronary Artery Disease Father 55        playing tennis    Cancer Maternal Grandmother     Alzheimer Disease Maternal Grandfather      "Coronary Artery Disease Paternal Grandfather 65        playing golf       Social History    She is  with 3 children    Tobacco History  History   Smoking Status    Never   Smokeless Tobacco    Never       ROS  CONSTITUTIONAL:  No fever, chills, or sweats. No weight gain/loss.   EENT:  No visual disturbance, ear ache, epistaxis, sore throat  ALLERGIES/IMMUNOLOGIC:  Negative  RESPIRATORY:  No cough, hemoptysis  CARDIOVASCULAR:  As per HPI  GI:  No nausea, vomiting, hematemesis, melena  :  No urinary frequency, dysuria, or hematuria  INTEGUMENT:  Negative  PSYCHIATRIC:  Negative  NEURO:  Negative  ENDOCRINE:  Negative  MUSCULOSKELETAL:  Negative     Vital Signs   LMP 12/26/2017       Waist: 30 inches  Hip: 38 inches    Physical Exam   In general, the patient is a pleasant female in no apparent distress   HEENT: NC/AT, PERRLA, EOMI, sclerae white, not injected. Nares clear, pharynx without erythema or exudate, dentition intact    Neck: No adenopathy, no thyromegaly, carotids +4/4 bilaterally without bruits,  no jugular venous distension   Lungs: Breath sounds clear bilaterally, without crackles, ronchi, or wheezes  Cor: RRR, S1S2 without murmur, rub, click, or gallop, the PMI is in the 5th ICS in the midclavicular line  Abdomen: Soft, nontender, nondistended, BS+ in all 4 quadrants, without hepatomegaly, no aorta or renal artery bruits  Extremities: No clubbing, cyanosis, or edema. DP and PT pulses +2/4 bilaterally    The 10-year ASCVD risk score (Herefordfinn DHALIWAL Jr., et al., 2013) is: 1.5%  Values used to calculate the score:   Age: 58 year old   Sex: female   Is Non- : No   Diabetic: No   Tobacco smoker: No   Systolic Blood Press: 113 mmHg   Is BP treated: No   HDL Cholesterol: 84 mg/dL   Total Cholesterol: 197 mg/dL    Recent Labs  Lab Results   Component Value Date    GLC 75 10/12/2023      No results found for: \"NTBNP\"  No results found for: \"NTBNPI\"   No results found for: \"UCRR\"   No " "results found for: \"MICROL\"   No results found for: \"MICROALBUMIN\"   No results found for: \"CRP\"   Lab Results   Component Value Date    CHOL 197 10/12/2023    CHOL 159 04/14/2015      Lab Results   Component Value Date    TRIG 56 10/12/2023    TRIG 44 04/14/2015      Lab Results   Component Value Date    HDL 84 10/12/2023    HDL 68 04/14/2015      Lab Results   Component Value Date     (H) 10/12/2023    LDL 82 04/14/2015      Lab Results   Component Value Date    VLDL 9 04/14/2015      Lab Results   Component Value Date    CHOLHDLRATIO 2.3 04/14/2015     Lab Results   Component Value Date    NHDL 113 10/12/2023        Assessment:    Cardiovascular:  Asymptomatic, she is not complaining of chest pain, EKG revealed SB, HR 55 bpm,  no plaque detected in carotid arteries, Recommend ordering a CAC to further investigate her risk of heart disease    Blood Pressure:  She does not take BP medication, -113/66-75 mmHg    Lipids:  She  does not take a statin medication, check Lp(a)   Latest Ref Rng 10/12/2023  12:37 PM   BP WT CHOL     Cholesterol <200 mg/dL 197    HDL Cholesterol >=50 mg/dL 84    LDL Cholesterol Calculated <=100 mg/dL 102 (H)    Triglycerides <150 mg/dL 56      Glucose: 75    Sleep pattern:  Sleep hygiene reviewed during clinic visit, handout given to patient    Weight Management: BMI 23.02    Return to Clinic: 5 years    Health Habit Summary:  Nutrition: Heart Healthy Eating:  most of the time   Exercise:  regularly active  Weight:  normal weight range  Tobacco Use:  never used    This case was presented to Dr. Quiroga and Dr. Evangelist Hobson during our weekly conference.     60 minutes spent on the date of the encounter doing (chart review/review of outside records/review of test results/interpretation of tests/patient visit/documentation/discussion with other provider(s)   TWILA Davidson CNP       CC  Patient Care Team:  Marimar Bianchi MD as PCP - General (Family Medicine)  Shereen Garland " MD [place] (Inactive) as Referring Physician (OB/Gyn)  Nico Lomeli MD as MD (Gastroenterology)  Bahman Noe MD as MD (Urology)  Regina Alan, RN as Registered Nurse (Urology)  Sarah Dillon MD as MD (OB/Gyn)  Chantal Barcenas MD as MD (OB/Gyn)  Lissette Méndez APRN CNP as Nurse Practitioner (Cardiovascular Disease)  JULIANA CIFUENTESmussen Test Results    WALKING BLOOD PRESSURE RESPONSE (3 minute, 5 MET level walk)   Pre BP: 90/60 mmHg  3 min BP: 118/54 mmHg  1 min post BP: 90/66 mmHg    Pre HR: 74 bpm  3 min HR: 102 bpm  1 min post HR: 82 bpm     Test results: Walking blood pressure response to 3 minutes activity is in normal range.     RETINAL VASCULAR ASSESSMENT   Left Eye Abnormality:  none  AV Ratio: 0.8    Right Eye Abnormality:  none  AV Ratio: 0.8     Retinal Assessment:  normal    ABDOMINAL AORTA ULTRASOUND (< 2.5 normal, borderline 2.5-2.9, abnormal > 3)   SupraIliac 1.8 cm    SupraRenal 1.7 cm    InfraRenal Proximal 1.9 cm    InfraRenal Distal 1.9 cm      Abdominal Aorta Assessment:  normal    LEFT VENTRICULAR ULTRASOUND MEASUREMENTS (adjusted for BSA)  LVIDD 50.00 mm   Septa 7.7 mm   Posterior 7.6 mm     Left Ventricular US Assessment:  normal    Carotid Artery IMT measurements report and plaques in the small area examined:   Left IMT 0.667 mm  Plaques none    Right IMT 0.631 mm  Plaques none     Test results: Carotid arteries wall thickening is in normal range after adjusting for age and gender.     ECG (see tracing):   Sinus bradycardia ;  rate: 55 bpm    Arterial Elasticity per age and gender (see printout):   C1 20.7 mL/mmHg x 10  normal   C2 10.7 mL/mmHg x 100 normal   Supine blood pressure: 113/75 mmHg     Test results: Arterial elasticity of the large and small size arteries is in normal range after adjusting for age and gender.     Mark disease score: 0    Nasreen Díaz      Please do not hesitate to contact me if you have any  questions/concerns.     Sincerely,     TWILA Davidson CNP

## 2023-10-13 LAB
ATRIAL RATE - MUSE: 55 BPM
DIASTOLIC BLOOD PRESSURE - MUSE: NORMAL MMHG
INTERPRETATION ECG - MUSE: NORMAL
P AXIS - MUSE: 63 DEGREES
PR INTERVAL - MUSE: 178 MS
QRS DURATION - MUSE: 94 MS
QT - MUSE: 472 MS
QTC - MUSE: 451 MS
R AXIS - MUSE: 63 DEGREES
SYSTOLIC BLOOD PRESSURE - MUSE: NORMAL MMHG
T AXIS - MUSE: 34 DEGREES
VENTRICULAR RATE- MUSE: 55 BPM

## 2023-10-16 ENCOUNTER — PRE VISIT (OUTPATIENT)
Dept: CARDIOLOGY | Facility: CLINIC | Age: 58
End: 2023-10-16
Payer: COMMERCIAL

## 2023-10-16 NOTE — PROGRESS NOTES
Mark Test Results    WALKING BLOOD PRESSURE RESPONSE (3 minute, 5 MET level walk)   Pre BP: 90/60 mmHg  3 min BP: 118/54 mmHg  1 min post BP: 90/66 mmHg    Pre HR: 74 bpm  3 min HR: 102 bpm  1 min post HR: 82 bpm     Test results: Walking blood pressure response to 3 minutes activity is in normal range.     RETINAL VASCULAR ASSESSMENT   Left Eye Abnormality:  none  AV Ratio: 0.8    Right Eye Abnormality:  none  AV Ratio: 0.8     Retinal Assessment:  normal    ABDOMINAL AORTA ULTRASOUND (< 2.5 normal, borderline 2.5-2.9, abnormal > 3)   SupraIliac 1.8 cm    SupraRenal 1.7 cm    InfraRenal Proximal 1.9 cm    InfraRenal Distal 1.9 cm      Abdominal Aorta Assessment:  normal    LEFT VENTRICULAR ULTRASOUND MEASUREMENTS (adjusted for BSA)  LVIDD 50.00 mm   Septa 7.7 mm   Posterior 7.6 mm     Left Ventricular US Assessment:  normal    Carotid Artery IMT measurements report and plaques in the small area examined:   Left IMT 0.667 mm  Plaques none    Right IMT 0.631 mm  Plaques none     Test results: Carotid arteries wall thickening is in normal range after adjusting for age and gender.     ECG (see tracing):   Sinus bradycardia ;  rate: 55 bpm    Arterial Elasticity per age and gender (see printout):   C1 20.7 mL/mmHg x 10  normal   C2 10.7 mL/mmHg x 100 normal   Supine blood pressure: 113/75 mmHg     Test results: Arterial elasticity of the large and small size arteries is in normal range after adjusting for age and gender.     Mark disease score: 0    Nasreen Díaz

## 2023-10-17 ENCOUNTER — OFFICE VISIT (OUTPATIENT)
Dept: FAMILY MEDICINE | Facility: CLINIC | Age: 58
End: 2023-10-17
Payer: COMMERCIAL

## 2023-10-17 VITALS
BODY MASS INDEX: 23.11 KG/M2 | DIASTOLIC BLOOD PRESSURE: 75 MMHG | OXYGEN SATURATION: 98 % | HEIGHT: 69 IN | TEMPERATURE: 98 F | WEIGHT: 156 LBS | SYSTOLIC BLOOD PRESSURE: 114 MMHG | HEART RATE: 58 BPM

## 2023-10-17 DIAGNOSIS — Z01.818 PREOP GENERAL PHYSICAL EXAM: Primary | ICD-10-CM

## 2023-10-17 DIAGNOSIS — Z41.1 ENCOUNTER FOR COSMETIC PROCEDURE: ICD-10-CM

## 2023-10-17 NOTE — NURSING NOTE
"58 year old  Chief Complaint   Patient presents with    Pre-Op Exam       Blood pressure 114/75, pulse 58, temperature 98  F (36.7  C), temperature source Skin, height 1.753 m (5' 9\"), weight 70.8 kg (156 lb), last menstrual period 12/26/2017, SpO2 98%, not currently breastfeeding. Body mass index is 23.04 kg/m .  Patient Active Problem List   Diagnosis    Depressive disorder    ASCUS of cervix with negative high risk HPV    Migraine       Wt Readings from Last 2 Encounters:   10/17/23 70.8 kg (156 lb)   10/12/23 70.7 kg (155 lb 14.4 oz)     BP Readings from Last 3 Encounters:   10/17/23 114/75   10/12/23 109/66   08/31/23 116/74         Current Outpatient Medications   Medication    melatonin 3 MG tablet    zolpidem (AMBIEN) 5 MG tablet     No current facility-administered medications for this visit.       Social History     Tobacco Use    Smoking status: Never    Smokeless tobacco: Never   Substance Use Topics    Alcohol use: Yes     Comment: 2 glasses of wine per night    Drug use: No       Health Maintenance Due   Topic Date Due    ADVANCE CARE PLANNING  Never done    HEPATITIS B IMMUNIZATION (1 of 3 - 3-dose series) Never done    HIV SCREENING  Never done    HEPATITIS C SCREENING  Never done    HPV TEST  01/25/2023    PAP  01/25/2023    INFLUENZA VACCINE (1) 09/01/2023    COVID-19 Vaccine (5 - 2023-24 season) 09/01/2023       Lab Results   Component Value Date    PAP NIL 01/25/2018 October 17, 2023 9:09 AM   "

## 2023-10-17 NOTE — PROGRESS NOTES
"[facility] [address], [address]  [place] [address]  Phone: [phone]  Fax: [phone]  Primary Provider: Marimar Bianchi  Pre-op Performing Provider: FEDE MAURICIO      PREOPERATIVE EVALUATION:  Today's date: 10/17/2023    Yelena is a 58 year old female who presents for a preoperative evaluation.       No data to display                Surgical Information:  Surgery/Procedure: Face Lift  Surgery Location: [facility]   Surgeon: Dr. Anup Bernstein  Surgery Date: 10/23  Time of Surgery: 11:00am  Where patient plans to recover: At home with family  Fax number for surgical facility: [phone]      Subjective       HPI related to upcoming procedure: Interested in a \"mini-Face lift\".          10/17/2023     9:07 AM   Preop Questions   1. Have you ever had a heart attack or stroke? No   2. Have you ever had surgery on your heart or blood vessels, such as a stent placement, a coronary artery bypass, or surgery on an artery in your head, neck, heart, or legs? No   3. Do you have chest pain with activity? No   4. Do you have a history of  heart failure? No   5. Do you currently have a cold, bronchitis or symptoms of other infection? No   6. Do you have a cough, shortness of breath, or wheezing? No   7. Do you or anyone in your family have previous history of blood clots? No   8. Do you or does anyone in your family have a serious bleeding problem such as prolonged bleeding following surgeries or cuts? No   9. Have you ever had problems with anemia or been told to take iron pills? No   10. Have you had any abnormal blood loss such as black, tarry or bloody stools, or abnormal vaginal bleeding? No   11. Have you ever had a blood transfusion? No   12. Are you willing to have a blood transfusion if it is medically needed before, during, or after your surgery? Yes   13. Have you or any of your relatives ever had problems with anesthesia? No   14. " Do you have sleep apnea, excessive snoring or daytime drowsiness? No   15. Do you have any artifical heart valves or other implanted medical devices like a pacemaker, defibrillator, or continuous glucose monitor? No   16. Do you have artificial joints? No   17. Are you allergic to latex? No   18. Is there any chance that you may be pregnant? No     Health Care Directive:  Patient does not have a Health Care Directive or Living Will: Discussed advance care planning with patient; information given to patient to review.    Preoperative Review of :   reviewed - no record of controlled substances prescribed.          Review of Systems  Constitutional, neuro, ENT, endocrine, pulmonary, cardiac, gastrointestinal, genitourinary, musculoskeletal, integument and psychiatric systems are negative, except as otherwise noted.    Patient Active Problem List    Diagnosis Date Noted    Depressive disorder 08/31/2023     Priority: Medium    Migraine 08/31/2023     Priority: Medium    ASCUS of cervix with negative high risk HPV 10/01/2016     Priority: Medium     Formatting of this note might be different from the original. 10/2016 ASCUS/HPV negative 01/2018 NIL/HPV negative 02/2023 NIL/HPV negative. Plan: Pap/HPV due 02/2028.        Past Medical History:   Diagnosis Date    Family history of early CAD     Female stress incontinence 09/28/2016    Reactive arthritis (H)     Skin cancer of chest, excluding breast     S/P Mohs procedure     Past Surgical History:   Procedure Laterality Date    BACK SURGERY      COLONOSCOPY  9-1-2015    CYSTOSCOPY N/A 1/17/2017    Procedure: CYSTOSCOPY;  Surgeon: Bahman Noe MD;  Location:  OR     TOOTH EXTRACTION W/FORCEP      LEG SURGERY      Titanium sri in leg     Current Outpatient Medications   Medication Sig Dispense Refill    melatonin 3 MG tablet Take 3 mg by mouth nightly as needed      zolpidem (AMBIEN) 5 MG tablet Take 1 tablet (5 mg) by mouth nightly as needed for sleep 20  "tablet 0       No Known Allergies     Social History     Tobacco Use    Smoking status: Never    Smokeless tobacco: Never   Substance Use Topics    Alcohol use: Yes     Comment: 2 glasses of wine per night     History   Drug Use No         Objective     LMP 12/26/2017     Physical Exam  /75 (BP Location: Right arm, Patient Position: Sitting, Cuff Size: Adult Regular)   Pulse 58   Temp 98  F (36.7  C) (Skin)   Ht 1.753 m (5' 9\")   Wt 70.8 kg (156 lb)   LMP 12/26/2017   SpO2 98%   BMI 23.04 kg/m        GENERAL APPEARANCE: healthy, alert and no distress   HEENT - Normal, including: Normal Tms, clear oropharynx and neck that is supple and without lymphadenopathy     RESP: lungs clear to auscultation - no rales, rhonchi or wheezes     CV: regular rates and rhythm, normal S1 S2, no S3 or S4 and no murmur, click or rub     ABDOMEN:  soft, nontender, no HSM or masses and bowel sounds normal     MS: extremities normal- no gross deformities noted, no evidence of inflammation in joints, FROM in all extremities.     SKIN: no suspicious lesions or rashes     NEURO: Normal strength and tone, sensory exam grossly normal, mentation intact and speech normal     PSYCH: mentation appears normal. and affect normal/bright     LYMPHATICS: No cervical adenopathy    Diagnostics:  Had recent normal Lipids and ECG with sinus bradycardia at 55 bpm.     Revised Cardiac Risk Index (RCRI):  The patient has the following serious cardiovascular risks for perioperative complications:   - No serious cardiac risks = 0 points     RCRI Interpretation: 0 points: Class I (very low risk - 0.4% complication rate)     A/ Healthy 57 yo female    P/ Approved for surgery for mini Face lift.   Paper form signed.     Signed Electronically by: Stephen Cormier MD  Copy of this evaluation report is provided to requesting physician.      "

## 2023-10-17 NOTE — PATIENT INSTRUCTIONS
Screening Results Summary Report     [facility] for Cardiovascular Disease Prevention    Thank you for choosing to participate in the prevention screening offered at the [facility]. Prevention screening is important part of health care.  Atherosclerosis may result in heart attacks, strokes, heart failure, peripheral artery disease and shortened life expectancy. The risk for premature development of this disease is both genetic (family history) and environmental (diet, exercise, lifestyle, etc.).  Goals of your cardiovascular prevention screening include detecting the earliest signs of blood vessel or heart abnormalities, and identifying markers for risk that can be treated if identified early. Recommendations are included to improve health habits. In some cases medication may be recommended to help slow progression of disease. Our goal is to assist you in prevention of a heart attack, stroke and other cardiovascular diseases that are the major cause of illness and mortality in our society.    Your total cholesterol and LDL (bad cholesterol) results are in the  optimal  range. Your other cholesterol numbers are also at goal.  We recommend continuation of ongoing health habit modification (heart healthy nutrition, maintaining your weight within a normal weight range and an exercise routine) to maintain these levels.  An ideal weight range is a body mass index of 25 or less.     2. Your diet is heart healthy and well balanced.  We recommend increasing your intake of vegetables to 4-5 servings/day and increasing your fruit intake to 3-4 servings/day and incorporating healthy fats of the the Mediterranean diet into your diet. Eating salmon and using extra virgin olive oil are good examples. Nutrients found in fruits, vegetables and whole grains have been shown to be beneficial for the long-term health of your heart and blood vessels.     3. Great job with your regular exercise regimen!  Regular exercise can  help lower cholesterol, blood pressure, blood glucose and improve the health of your heart and blood vessels. The American Heart Association recommends 150 minutes of exercise per week, including strength (resistance training).  Always exercise within your comfort zone (no chest pain, able to talk comfortably).    4. We suggest that you consider incorporating 4-7-8 relaxation breathing, mindfulness stress reduction, meditation, yoga,and/or aromatherapy into your healthy lifestyle routine. All of these integrative therapies have been shown to be useful in reducing stress and promoting health. The website for the [facility] at the [facility] is [email or web address]. Taking Charge of Your Health and Wellbeing is a wonderful assessment tool to learn more about your wellbeing.    5. A coronary artery calcium scan is recommended to further assess your cardiovascular risk. This test may or may not be covered by insurance and costs approximately $150 ($160 with tax) and can be obtained at a variety of cardiovascular centers. If lung nodules are observed with this test follow up scans may be needed for further evaluation. An order for this diagnostic test has been placed in your electronic medical record.  Call [facility] at [phone] to schedule this non-invasive diagnostic test.     6. Return to clinic in 5 years.    Thank you for choosing to participate in the prevention screening at [facility] CV Prevention clinic.  Cardiovascular prevention screening is important. Atherosclerosis may result in heart attacks, strokes, heart failure, peripheral artery disease.    Lissette Méndez, DNP, APRN, FNP-C

## 2023-11-01 ENCOUNTER — LAB REQUISITION (OUTPATIENT)
Dept: LAB | Facility: CLINIC | Age: 58
End: 2023-11-01
Payer: COMMERCIAL

## 2023-11-01 DIAGNOSIS — D48.5 NEOPLASM OF UNCERTAIN BEHAVIOR OF SKIN: ICD-10-CM

## 2023-11-01 PROCEDURE — 88305 TISSUE EXAM BY PATHOLOGIST: CPT | Mod: 26 | Performed by: DERMATOLOGY

## 2023-11-01 PROCEDURE — 88305 TISSUE EXAM BY PATHOLOGIST: CPT | Mod: TC,ORL | Performed by: DERMATOLOGY

## 2023-11-03 LAB
PATH REPORT.COMMENTS IMP SPEC: ABNORMAL
PATH REPORT.COMMENTS IMP SPEC: ABNORMAL
PATH REPORT.COMMENTS IMP SPEC: YES
PATH REPORT.FINAL DX SPEC: ABNORMAL
PATH REPORT.GROSS SPEC: ABNORMAL
PATH REPORT.MICROSCOPIC SPEC OTHER STN: ABNORMAL
PATH REPORT.RELEVANT HX SPEC: ABNORMAL

## 2023-12-04 ENCOUNTER — TELEPHONE (OUTPATIENT)
Dept: GASTROENTEROLOGY | Facility: CLINIC | Age: 58
End: 2023-12-04
Payer: COMMERCIAL

## 2023-12-04 DIAGNOSIS — Z12.11 ENCOUNTER FOR SCREENING COLONOSCOPY: Primary | ICD-10-CM

## 2023-12-04 RX ORDER — BISACODYL 5 MG/1
TABLET, DELAYED RELEASE ORAL
Qty: 4 TABLET | Refills: 0 | Status: SHIPPED | OUTPATIENT
Start: 2023-12-04 | End: 2024-05-17

## 2023-12-04 NOTE — TELEPHONE ENCOUNTER
Attempted to contact patient in order to complete pre assessment questions.     No answer. Left message to return call to [phone] option 4      Procedure details:    Patient scheduled for Colonoscopy  on 12/18/23.     Arrival time: 1000. Procedure time 1100    Pre op exam needed? N/A    [facility] [address] [place] [address], [address] Floor, [place] [address]    Sedation type: Conscious sedation     Indication for procedure: screening       Chart review:     Electronic implanted devices? No    Recent diagnosis of diverticulitis within the last 6 weeks? No    Diabetic? No    Medication review:    Anticoagulants? No    NSAIDS? No    Other medication HOLDING recommendations:  N/A      Prep for procedure:     Bowel prep recommendation: Extended Golytely   Due to: poor prep/inadequate bowel prep in the past.     Prep instructions sent via buuteeq Updated as miralax was sent during scheduling. Bowel prep script sent to [id number] - [place], [place] - [address] RN  Endoscopy Procedure Pre Assessment RN

## 2023-12-08 NOTE — TELEPHONE ENCOUNTER
Second call attempt to complete pre assessment.     Patient scheduled for Colonoscopy  on 12/18/23.     No answer.  Left message to return call to [phone] #4 by next business day prior to 4PM or procedure will be sent to cancel.     Additional information needed?  Sajanhart message sent.      Jacquie Fernando RN  Endoscopy Procedure Pre Assessment RN

## 2023-12-08 NOTE — TELEPHONE ENCOUNTER
Pre assessment completed for upcoming procedure.   (Please see previous telephone encounter notes for complete details)    Patient  returned call.       Procedure details:    Arrival time and facility location reviewed.    Pre op exam needed? N/A    Designated  policy reviewed. Instructed to have someone stay 6 hours post procedure.     COVID policy reviewed.      Medication review:    Medications reviewed. Please see supporting documentation below. Holding recommendations discussed (if applicable).       Prep for procedure:     Procedure prep instructions reviewed.        Additional information needed?  N/A      Patient  verbalized understanding and had no questions or concerns at this time.      Dolores Downey RN  Endoscopy Procedure Pre Assessment RN  [phone] option 4

## 2023-12-18 ENCOUNTER — HOSPITAL ENCOUNTER (OUTPATIENT)
Facility: AMBULATORY SURGERY CENTER | Age: 58
Discharge: HOME | End: 2023-12-18
Attending: INTERNAL MEDICINE | Admitting: INTERNAL MEDICINE
Payer: COMMERCIAL

## 2023-12-18 VITALS
BODY MASS INDEX: 23.11 KG/M2 | TEMPERATURE: 97.2 F | WEIGHT: 156 LBS | DIASTOLIC BLOOD PRESSURE: 66 MMHG | SYSTOLIC BLOOD PRESSURE: 111 MMHG | RESPIRATION RATE: 16 BRPM | HEART RATE: 52 BPM | OXYGEN SATURATION: 100 % | HEIGHT: 69 IN

## 2023-12-18 LAB — COLONOSCOPY: NORMAL

## 2023-12-18 PROCEDURE — 45385 COLONOSCOPY W/LESION REMOVAL: CPT | Mod: 33 | Performed by: INTERNAL MEDICINE

## 2023-12-18 PROCEDURE — 88305 TISSUE EXAM BY PATHOLOGIST: CPT | Mod: TC | Performed by: INTERNAL MEDICINE

## 2023-12-18 PROCEDURE — 88305 TISSUE EXAM BY PATHOLOGIST: CPT | Mod: 26 | Performed by: STUDENT IN AN ORGANIZED HEALTH CARE EDUCATION/TRAINING PROGRAM

## 2023-12-18 RX ORDER — LIDOCAINE 40 MG/G
CREAM TOPICAL
Status: DISCONTINUED | OUTPATIENT
Start: 2023-12-18 | End: 2023-12-18 | Stop reason: HOSPADM

## 2023-12-18 RX ORDER — PROCHLORPERAZINE MALEATE 10 MG
10 TABLET ORAL EVERY 6 HOURS PRN
Status: CANCELLED | OUTPATIENT
Start: 2023-12-18

## 2023-12-18 RX ORDER — FENTANYL CITRATE 50 UG/ML
INJECTION, SOLUTION INTRAMUSCULAR; INTRAVENOUS PRN
Status: DISCONTINUED | OUTPATIENT
Start: 2023-12-18 | End: 2023-12-18 | Stop reason: HOSPADM

## 2023-12-18 RX ORDER — FLUMAZENIL 0.1 MG/ML
0.2 INJECTION, SOLUTION INTRAVENOUS
Status: CANCELLED | OUTPATIENT
Start: 2023-12-18 | End: 2023-12-19

## 2023-12-18 RX ORDER — NALOXONE HYDROCHLORIDE 0.4 MG/ML
0.2 INJECTION, SOLUTION INTRAMUSCULAR; INTRAVENOUS; SUBCUTANEOUS
Status: DISCONTINUED | OUTPATIENT
Start: 2023-12-18 | End: 2023-12-19 | Stop reason: HOSPADM

## 2023-12-18 RX ORDER — ONDANSETRON 4 MG/1
4 TABLET, ORALLY DISINTEGRATING ORAL EVERY 6 HOURS PRN
Status: CANCELLED | OUTPATIENT
Start: 2023-12-18

## 2023-12-18 RX ORDER — NALOXONE HYDROCHLORIDE 0.4 MG/ML
0.4 INJECTION, SOLUTION INTRAMUSCULAR; INTRAVENOUS; SUBCUTANEOUS
Status: DISCONTINUED | OUTPATIENT
Start: 2023-12-18 | End: 2023-12-19 | Stop reason: HOSPADM

## 2023-12-18 RX ORDER — ONDANSETRON 2 MG/ML
4 INJECTION INTRAMUSCULAR; INTRAVENOUS EVERY 6 HOURS PRN
Status: DISCONTINUED | OUTPATIENT
Start: 2023-12-18 | End: 2023-12-19 | Stop reason: HOSPADM

## 2023-12-18 RX ORDER — ONDANSETRON 2 MG/ML
4 INJECTION INTRAMUSCULAR; INTRAVENOUS EVERY 6 HOURS PRN
Status: CANCELLED | OUTPATIENT
Start: 2023-12-18

## 2023-12-18 RX ORDER — PROCHLORPERAZINE MALEATE 10 MG
10 TABLET ORAL EVERY 6 HOURS PRN
Status: DISCONTINUED | OUTPATIENT
Start: 2023-12-18 | End: 2023-12-19 | Stop reason: HOSPADM

## 2023-12-18 RX ORDER — ONDANSETRON 2 MG/ML
4 INJECTION INTRAMUSCULAR; INTRAVENOUS
Status: DISCONTINUED | OUTPATIENT
Start: 2023-12-18 | End: 2023-12-18 | Stop reason: HOSPADM

## 2023-12-18 RX ORDER — SODIUM CHLORIDE, SODIUM LACTATE, POTASSIUM CHLORIDE, CALCIUM CHLORIDE 600; 310; 30; 20 MG/100ML; MG/100ML; MG/100ML; MG/100ML
INJECTION, SOLUTION INTRAVENOUS CONTINUOUS
Status: DISCONTINUED | OUTPATIENT
Start: 2023-12-18 | End: 2023-12-18 | Stop reason: HOSPADM

## 2023-12-18 RX ORDER — FLUMAZENIL 0.1 MG/ML
0.2 INJECTION, SOLUTION INTRAVENOUS
Status: DISCONTINUED | OUTPATIENT
Start: 2023-12-18 | End: 2023-12-19 | Stop reason: HOSPADM

## 2023-12-18 RX ORDER — ONDANSETRON 4 MG/1
4 TABLET, ORALLY DISINTEGRATING ORAL EVERY 6 HOURS PRN
Status: DISCONTINUED | OUTPATIENT
Start: 2023-12-18 | End: 2023-12-19 | Stop reason: HOSPADM

## 2023-12-18 RX ADMIN — SODIUM CHLORIDE, SODIUM LACTATE, POTASSIUM CHLORIDE, CALCIUM CHLORIDE: 600; 310; 30; 20 INJECTION, SOLUTION INTRAVENOUS at 11:15

## 2023-12-18 NOTE — H&P
Yelena Jacob  [id number]  female  58 year old      Reason for procedure/surgery: colon polyp surveillance    Patient Active Problem List   Diagnosis    Depressive disorder    ASCUS of cervix with negative high risk HPV    Migraine       Past Surgical History:    Past Surgical History:   Procedure Laterality Date    BACK SURGERY      COLONOSCOPY  9-1-2015    CYSTOSCOPY N/A 1/17/2017    Procedure: CYSTOSCOPY;  Surgeon: Bahman Noe MD;  Location: [facility]    HC TOOTH EXTRACTION W/FORCEP      LEG SURGERY      Titanium sri in leg       Past Medical History:   Past Medical History:   Diagnosis Date    Family history of early CAD     Female stress incontinence 09/28/2016    Reactive arthritis (H)     Skin cancer of chest, excluding breast     S/P Mohs procedure       Social History:   Social History     Tobacco Use    Smoking status: Never    Smokeless tobacco: Never   Substance Use Topics    Alcohol use: Yes     Comment: 2 glasses of wine per night       Family History:   Family History   Problem Relation Age of Onset    Coronary Artery Disease Father 55        playing tennis    Cancer Maternal Grandmother     Alzheimer Disease Maternal Grandfather     Coronary Artery Disease Paternal Grandfather 65        playing golf       Allergies: No Known Allergies    Active Medications:   Current Outpatient Medications   Medication Sig Dispense Refill    melatonin 3 MG tablet Take 3 mg by mouth nightly as needed      bisacodyl (DULCOLAX) 5 MG EC tablet Two days prior to exam take two (2) tablets at 4pm. One day prior to exam take two (2) tablets at 4pm 4 tablet 0    polyethylene glycol (GOLYTELY) 236 g suspension Take as directed. Two days before your exam fill the first container with water. Cover and shake until mixed well. At 5:00pm drink one 8oz glass every 10-15 minutes until half (1/2) of the first container is empty. Store the remainder in the refrigerator. One day before your exam at 5:00pm drink the second half of  "the first container until it is gone. Before you go to bed mix the second container with water and put in refrigerator. Six hours before your check in time drink one 8oz glass every 10-15 minutes until half of container is empty. Discard the remainder of solution. 8000 mL 0    zolpidem (AMBIEN) 5 MG tablet Take 1 tablet (5 mg) by mouth nightly as needed for sleep (Patient not taking: Reported on 10/17/2023) 20 tablet 0       Systemic Review:   CONSTITUTIONAL: NEGATIVE for fever, chills, change in weight  ENT/MOUTH: NEGATIVE for ear, mouth and throat problems  RESP: NEGATIVE for significant cough or SOB  CV: NEGATIVE for chest pain, palpitations or peripheral edema    Physical Examination:   Vital Signs: /86 (BP Location: Right arm)   Pulse 56   Temp 98  F (36.7  C) (Temporal)   Resp 18   Ht 1.753 m (5' 9\")   Wt 70.8 kg (156 lb)   LMP 12/26/2017   SpO2 98%   BMI 23.04 kg/m    GENERAL: healthy, alert and no distress  NECK: no adenopathy, no asymmetry, masses, or scars  RESP: lungs clear to auscultation - no rales, rhonchi or wheezes  CV: regular rate and rhythm, normal S1 S2, no S3 or S4, no murmur, click or rub, no peripheral edema and peripheral pulses strong  ABDOMEN: soft, nontender, no hepatosplenomegaly, no masses and bowel sounds normal  MS: no gross musculoskeletal defects noted, no edema    ASA Classification: (I)  Normal healthy patient  Airway Exam: Mallampati Score: Class II (Complete visualization of uvula)    Plan: Appropriate to proceed as scheduled.      Kilo Parker MD  12/18/2023    PCP:  Marimar Bianchi    "

## 2023-12-19 LAB
PATH REPORT.COMMENTS IMP SPEC: NORMAL
PATH REPORT.COMMENTS IMP SPEC: NORMAL
PATH REPORT.FINAL DX SPEC: NORMAL
PATH REPORT.GROSS SPEC: NORMAL
PATH REPORT.MICROSCOPIC SPEC OTHER STN: NORMAL
PATH REPORT.RELEVANT HX SPEC: NORMAL
PHOTO IMAGE: NORMAL

## 2023-12-21 ENCOUNTER — LAB REQUISITION (OUTPATIENT)
Dept: LAB | Facility: CLINIC | Age: 58
End: 2023-12-21
Payer: COMMERCIAL

## 2023-12-21 DIAGNOSIS — D48.5 NEOPLASM OF UNCERTAIN BEHAVIOR OF SKIN: ICD-10-CM

## 2023-12-21 PROCEDURE — 88312 SPECIAL STAINS GROUP 1: CPT | Mod: TC,ORL | Performed by: DERMATOLOGY

## 2023-12-21 PROCEDURE — 88305 TISSUE EXAM BY PATHOLOGIST: CPT | Mod: 26 | Performed by: DERMATOLOGY

## 2023-12-21 PROCEDURE — 88312 SPECIAL STAINS GROUP 1: CPT | Mod: 26 | Performed by: DERMATOLOGY

## 2023-12-27 LAB
PATH REPORT.COMMENTS IMP SPEC: NORMAL
PATH REPORT.FINAL DX SPEC: NORMAL
PATH REPORT.GROSS SPEC: NORMAL
PATH REPORT.MICROSCOPIC SPEC OTHER STN: NORMAL
PATH REPORT.RELEVANT HX SPEC: NORMAL

## 2024-01-18 ENCOUNTER — HOSPITAL ENCOUNTER (OUTPATIENT)
Dept: CARDIOLOGY | Facility: CLINIC | Age: 59
Discharge: HOME | End: 2024-01-18
Attending: NURSE PRACTITIONER | Admitting: NURSE PRACTITIONER
Payer: COMMERCIAL

## 2024-01-18 DIAGNOSIS — Z82.49 FAMILY HISTORY OF ISCHEMIC HEART DISEASE: ICD-10-CM

## 2024-01-18 PROCEDURE — 75571 CT HRT W/O DYE W/CA TEST: CPT

## 2024-01-18 PROCEDURE — 75571 CT HRT W/O DYE W/CA TEST: CPT | Mod: 26 | Performed by: INTERNAL MEDICINE

## 2024-04-06 ENCOUNTER — HEALTH MAINTENANCE LETTER (OUTPATIENT)
Age: 59
End: 2024-04-06

## 2024-05-17 ENCOUNTER — OFFICE VISIT (OUTPATIENT)
Dept: FAMILY MEDICINE | Facility: CLINIC | Age: 59
End: 2024-05-17
Payer: COMMERCIAL

## 2024-05-17 VITALS
HEART RATE: 53 BPM | OXYGEN SATURATION: 98 % | DIASTOLIC BLOOD PRESSURE: 63 MMHG | HEIGHT: 69 IN | TEMPERATURE: 97 F | WEIGHT: 159 LBS | SYSTOLIC BLOOD PRESSURE: 107 MMHG | BODY MASS INDEX: 23.55 KG/M2

## 2024-05-17 DIAGNOSIS — Z01.818 PREOP GENERAL PHYSICAL EXAM: Primary | ICD-10-CM

## 2024-05-17 DIAGNOSIS — S62.102A WRIST FRACTURE, LEFT, CLOSED, INITIAL ENCOUNTER: ICD-10-CM
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
## 2024-05-17 NOTE — NURSING NOTE
"58 year old  Chief Complaint   Patient presents with    Pre-Op Exam     Wrist Surgery       Blood pressure 107/63, pulse 53, temperature 97  F (36.1  C), height 1.753 m (5' 9.02\"), weight 72.1 kg (159 lb), last menstrual period 12/26/2017, SpO2 98%, not currently breastfeeding. Body mass index is 23.47 kg/m .  Patient Active Problem List   Diagnosis    Depressive disorder    ASCUS of cervix with negative high risk HPV    Migraine       Wt Readings from Last 2 Encounters:   05/17/24 72.1 kg (159 lb)   12/18/23 70.8 kg (156 lb)     BP Readings from Last 3 Encounters:   05/17/24 107/63   12/18/23 111/66   10/17/23 114/75         Current Outpatient Medications   Medication Sig Dispense Refill    melatonin 3 MG tablet Take 3 mg by mouth nightly as needed      bisacodyl (DULCOLAX) 5 MG EC tablet Two days prior to exam take two (2) tablets at 4pm. One day prior to exam take two (2) tablets at 4pm 4 tablet 0    polyethylene glycol (GOLYTELY) 236 g suspension Take as directed. Two days before your exam fill the first container with water. Cover and shake until mixed well. At 5:00pm drink one 8oz glass every 10-15 minutes until half (1/2) of the first container is empty. Store the remainder in the refrigerator. One day before your exam at 5:00pm drink the second half of the first container until it is gone. Before you go to bed mix the second container with water and put in refrigerator. Six hours before your check in time drink one 8oz glass every 10-15 minutes until half of container is empty. Discard the remainder of solution. 8000 mL 0    zolpidem (AMBIEN) 5 MG tablet Take 1 tablet (5 mg) by mouth nightly as needed for sleep (Patient not taking: Reported on 10/17/2023) 20 tablet 0     No current facility-administered medications for this visit.       Social History     Tobacco Use    Smoking status: Never    Smokeless tobacco: Never   Substance Use Topics    Alcohol use: Yes     Comment: 2 glasses of wine per night    " Drug use: No       Health Maintenance Due   Topic Date Due    HIV SCREENING  Never done    HEPATITIS C SCREENING  Never done    HEPATITIS B IMMUNIZATION (1 of 3 - 19+ 3-dose series) Never done    COVID-19 Vaccine (5 - 2023-24 season) 09/01/2023    YEARLY PREVENTIVE VISIT  02/02/2024       Lab Results   Component Value Date    PAP NIL 01/25/2018         May 17, 2024 10:00 AM

## 2024-09-09 ENCOUNTER — ANCILLARY PROCEDURE (OUTPATIENT)
Dept: MAMMOGRAPHY | Facility: CLINIC | Age: 59
End: 2024-09-09
Attending: FAMILY MEDICINE
Payer: COMMERCIAL

## 2024-09-09 DIAGNOSIS — Z12.31 VISIT FOR SCREENING MAMMOGRAM: ICD-10-CM

## 2024-09-09 PROCEDURE — 77067 SCR MAMMO BI INCL CAD: CPT | Performed by: RADIOLOGY

## 2024-09-09 PROCEDURE — 77063 BREAST TOMOSYNTHESIS BI: CPT | Performed by: RADIOLOGY

## 2024-11-20 ENCOUNTER — OFFICE VISIT (OUTPATIENT)
Dept: FAMILY MEDICINE | Facility: CLINIC | Age: 59
End: 2024-11-20
Payer: COMMERCIAL

## 2024-11-20 VITALS
TEMPERATURE: 98.6 F | WEIGHT: 163 LBS | OXYGEN SATURATION: 99 % | DIASTOLIC BLOOD PRESSURE: 77 MMHG | HEIGHT: 69 IN | SYSTOLIC BLOOD PRESSURE: 139 MMHG | BODY MASS INDEX: 24.14 KG/M2 | HEART RATE: 56 BPM

## 2024-11-20 DIAGNOSIS — Z87.81 HISTORY OF WRIST FRACTURE: ICD-10-CM

## 2024-11-20 DIAGNOSIS — Z78.0 POSTMENOPAUSAL STATUS: ICD-10-CM

## 2024-11-20 DIAGNOSIS — N95.8 GENITOURINARY SYNDROME OF MENOPAUSE: ICD-10-CM

## 2024-11-20 DIAGNOSIS — Z00.00 ROUTINE GENERAL MEDICAL EXAMINATION AT A HEALTH CARE FACILITY: Primary | ICD-10-CM

## 2024-11-20 DIAGNOSIS — Z11.3 ROUTINE SCREENING FOR STI (SEXUALLY TRANSMITTED INFECTION): ICD-10-CM

## 2024-11-20 PROBLEM — F32.A DEPRESSIVE DISORDER: Status: RESOLVED | Noted: 2023-08-31 | Resolved: 2024-11-20

## 2024-11-20 PROCEDURE — 87491 CHLMYD TRACH DNA AMP PROBE: CPT | Performed by: FAMILY MEDICINE

## 2024-11-20 PROCEDURE — 87591 N.GONORRHOEAE DNA AMP PROB: CPT | Performed by: FAMILY MEDICINE

## 2024-11-20 RX ORDER — ESTRADIOL 0.1 MG/G
2 CREAM VAGINAL
Qty: 42.5 G | Refills: 3 | Status: SHIPPED | OUTPATIENT
Start: 2024-11-21

## 2024-11-20 SDOH — HEALTH STABILITY: PHYSICAL HEALTH: ON AVERAGE, HOW MANY DAYS PER WEEK DO YOU ENGAGE IN MODERATE TO STRENUOUS EXERCISE (LIKE A BRISK WALK)?: 5 DAYS

## 2024-11-20 ASSESSMENT — SOCIAL DETERMINANTS OF HEALTH (SDOH): HOW OFTEN DO YOU GET TOGETHER WITH FRIENDS OR RELATIVES?: THREE TIMES A WEEK

## 2024-11-20 NOTE — PATIENT INSTRUCTIONS
Imaging scheduling  [facility] [phone] [facility] [place]  Information on vaginal estrogen cream used in a local fashion    I have sent a prescription for a vaginal estrogen cream to your pharmacy.  The reason for this is to help with some of the changes that occur with lower levels of estrogen.  Sometimes this is because of perimenopause and menopause but can also be while breastfeeding, especially if you are using a progestin only pill or depo provera.    Common symptoms can include vaginal dryness, vaginal pain, pain with intercourse, chronic vaginal infections, chronic urinary infections and urinary urgency (feeling like you need to pee all the time)    My intention is for you to use the vaginal cream as a topical (on top of the labia and around the vaginal opening) and as a local treatment. Used in this way, this medication should not increase your blood levels (or systemic level)  of estrogen.  It works locally, right in the vaginal area.       Many of the preparations come with an applicator to insert cream into the vagina, DO NOT USE THE APPLICATOR, you can even throw it away.  Instead squeeze out about an inch long line of cream onto your finger and rub it into the labia and vaginal opening. Do this every day for two weeks and then two times weekly for maintenance.       Patient Education   Preventive Care Advice   This is general advice given by our system to help you stay healthy. However, your care team may have specific advice just for you. Please talk to your care team about your preventive care needs.  Nutrition  Eat 5 or more servings of fruits and vegetables each day.  Try wheat bread, brown rice and whole grain pasta (instead of white bread, rice, and pasta).  Get enough calcium and vitamin D. Check the label on foods and aim for 100% of the RDA (recommended daily allowance).  Lifestyle  Exercise at least 150 minutes each week  (30 minutes a day, 5 days a week).  Do muscle  strengthening activities 2 days a week. These help control your weight and prevent disease.  No smoking.  Wear sunscreen to prevent skin cancer.  Have a dental exam and cleaning every 6 months.  Yearly exams  See your health care team every year to talk about:  Any changes in your health.  Any medicines your care team has prescribed.  Preventive care, family planning, and ways to prevent chronic diseases.  Shots (vaccines)   HPV shots (up to age 26), if you've never had them before.  Hepatitis B shots (up to age 59), if you've never had them before.  COVID-19 shot: Get this shot when it's due.  Flu shot: Get a flu shot every year.  Tetanus shot: Get a tetanus shot every 10 years.  Pneumococcal, hepatitis A, and RSV shots: Ask your care team if you need these based on your risk.  Shingles shot (for age 50 and up)  General health tests  Diabetes screening:  Starting at age 35, Get screened for diabetes at least every 3 years.  If you are younger than age 35, ask your care team if you should be screened for diabetes.  Cholesterol test: At age 39, start having a cholesterol test every 5 years, or more often if advised.  Bone density scan (DEXA): At age 50, ask your care team if you should have this scan for osteoporosis (brittle bones).  Hepatitis C: Get tested at least once in your life.  STIs (sexually transmitted infections)  Before age 24: Ask your care team if you should be screened for STIs.  After age 24: Get screened for STIs if you're at risk. You are at risk for STIs (including HIV) if:  You are sexually active with more than one person.  You don't use condoms every time.  You or a partner was diagnosed with a sexually transmitted infection.  If you are at risk for HIV, ask about PrEP medicine to prevent HIV.  Get tested for HIV at least once in your life, whether you are at risk for HIV or not.  Cancer screening tests  Cervical cancer screening: If you have a cervix, begin getting regular cervical cancer  screening tests starting at age 21.  Breast cancer scan (mammogram): If you've ever had breasts, begin having regular mammograms starting at age 40. This is a scan to check for breast cancer.  Colon cancer screening: It is important to start screening for colon cancer at age 45.  Have a colonoscopy test every 10 years (or more often if you're at risk) Or, ask your provider about stool tests like a FIT test every year or Cologuard test every 3 years.  To learn more about your testing options, visit:   .  For help making a decision, visit:   https://bit.ly/zj68297.  Prostate cancer screening test: If you have a prostate, ask your care team if a prostate cancer screening test (PSA) at age 55 is right for you.  Lung cancer screening: If you are a current or former smoker ages 50 to 80, ask your care team if ongoing lung cancer screenings are right for you.  For informational purposes only. Not to replace the advice of your health care provider. Copyright   2023 [employer] All rights reserved. Clinically reviewed by the [facility] Transitions Program. [place] 999358 - REV 01/24.  Learning About Stress  What is stress?     Stress is your body's response to a hard situation. Your body can have a physical, emotional, or mental response. Stress is a fact of life for most people, and it affects everyone differently. What causes stress for you may not be stressful for someone else.  A lot of things can cause stress. You may feel stress when you go on a job interview, take a test, or run a race. This kind of short-term stress is normal and even useful. It can help you if you need to work hard or react quickly. For example, stress can help you finish an important job on time.  Long-term stress is caused by ongoing stressful situations or events. Examples of long-term stress include long-term health problems, ongoing problems at work, or conflicts in your family. Long-term stress can harm your  health.  How does stress affect your health?  When you are stressed, your body responds as though you are in danger. It makes hormones that speed up your heart, make you breathe faster, and give you a burst of energy. This is called the fight-or-flight stress response. If the stress is over quickly, your body goes back to normal and no harm is done.  But if stress happens too often or lasts too long, it can have bad effects. Long-term stress can make you more likely to get sick, and it can make symptoms of some diseases worse. If you tense up when you are stressed, you may develop neck, shoulder, or low back pain. Stress is linked to high blood pressure and heart disease.  Stress also harms your emotional health. It can make you yanes, tense, or depressed. Your relationships may suffer, and you may not do well at work or school.  What can you do to manage stress?  You can try these things to help manage stress:   Do something active. Exercise or activity can help reduce stress. Walking is a great way to get started. Even everyday activities such as housecleaning or yard work can help.  Try yoga or juan chi. These techniques combine exercise and meditation. You may need some training at first to learn them.  Do something you enjoy. For example, listen to music or go to a movie. Practice your hobby or do volunteer work.  Meditate. This can help you relax, because you are not worrying about what happened before or what may happen in the future.  Do guided imagery. Imagine yourself in any setting that helps you feel calm. You can use online videos, books, or a teacher to guide you.  Do breathing exercises. For example:  From a standing position, bend forward from the waist with your knees slightly bent. Let your arms dangle close to the floor.  Breathe in slowly and deeply as you return to a standing position. Roll up slowly and lift your head last.  Hold your breath for just a few seconds in the standing  "position.  Breathe out slowly and bend forward from the waist.  Let your feelings out. Talk, laugh, cry, and express anger when you need to. Talking with supportive friends or family, a counselor, or a deepa leader about your feelings is a healthy way to relieve stress. Avoid discussing your feelings with people who make you feel worse.  Write. It may help to write about things that are bothering you. This helps you find out how much stress you feel and what is causing it. When you know this, you can find better ways to cope.  What can you do to prevent stress?  You might try some of these things to help prevent stress:  Manage your time. This helps you find time to do the things you want and need to do.  Get enough sleep. Your body recovers from the stresses of the day while you are sleeping.  Get support. Your family, friends, and community can make a difference in how you experience stress.  Limit your news feed. Avoid or limit time on social media or news that may make you feel stressed.  Do something active. Exercise or activity can help reduce stress. Walking is a great way to get started.  Where can you learn more?  Go to https://www.Treater.net/patiented  Enter N032 in the search box to learn more about \"Learning About Stress.\"  Current as of: October 24, 2023  Content Version: 14.2 2024 [employer] [employer]   Care instructions adapted under license by your healthcare professional. If you have questions about a medical condition or this instruction, always ask your healthcare professional. [employer] disclaims any warranty or liability for your use of this information.    9 Ways to Cut Back on Drinking  Maybe you've found yourself drinking more alcohol than you'd prefer. If you want to cut back, here are some ideas to try.    Think before you drink.  Do you really want a drink, or is it just a habit? If you're used to having a drink at a certain time, try doing something else then.     " "Look for substitutes.  Find some no-alcohol drinks that you enjoy, like flavored seltzer water, tea with honey, or tonic with a slice of lime. Or try alcohol-free beer or \"virgin\" cocktails (without the alcohol).     Drink more water.  Use water to quench your thirst. Drink a glass of water before you have any alcohol. Have another glass along with every drink or between drinks.     Shrink your drink.  For example, have a bottle of beer instead of a pint. Use a smaller glass for wine. Choose drinks with lower alcohol content (ABV%). Or use less liquor and more mixer in cocktails.     Slow down.  It's easy to drink quickly and without thinking about it. Pay attention, and make each drink last longer.     Do the math.  Total up how much you spend on alcohol each month. How much is that a year? If you cut back, what could you do with the money you save?     Take a break.  Choose a day or two each week when you won't drink at all. Notice how you feel on those days, physically and emotionally. How did you sleep? Do you feel better? Over time, add more break days.     Count calories.  Would you like to lose some weight? For some people that's a good motivator for cutting back. Figure out how many calories are in each drink. How many does that add up to in a day? In a week? In a month?     Practice saying no.  Be ready when someone offers you a drink. Try: \"Thanks, I've had enough.\" Or \"Thanks, but I'm cutting back.\" Or \"No, thanks. I feel better when I drink less.\"   Current as of: November 15, 2023  Content Version: 14.2 2024 [employer] [employer]   Care instructions adapted under license by your healthcare professional. If you have questions about a medical condition or this instruction, always ask your healthcare professional. [employer] Inc[employer] disclaims any warranty or liability for your use of this information.     "
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
## 2024-11-21 LAB
C TRACH DNA SPEC QL PROBE+SIG AMP: NEGATIVE
N GONORRHOEA DNA SPEC QL NAA+PROBE: NEGATIVE

## 2025-01-22 ENCOUNTER — ANCILLARY PROCEDURE (OUTPATIENT)
Dept: BONE DENSITY | Facility: CLINIC | Age: 60
End: 2025-01-22
Attending: FAMILY MEDICINE
Payer: COMMERCIAL

## 2025-01-22 DIAGNOSIS — Z78.0 POSTMENOPAUSAL STATUS: ICD-10-CM

## 2025-01-22 PROBLEM — M85.9 LOW BONE DENSITY: Status: ACTIVE | Noted: 2025-01-22

## 2025-01-22 PROCEDURE — 77080 DXA BONE DENSITY AXIAL: CPT | Mod: TC | Performed by: PHYSICIAN ASSISTANT

## 2025-01-31 ENCOUNTER — TRANSFERRED RECORDS (OUTPATIENT)
Dept: HEALTH INFORMATION MANAGEMENT | Facility: CLINIC | Age: 60
End: 2025-01-31
Payer: COMMERCIAL

## 2025-04-08 ENCOUNTER — LAB REQUISITION (OUTPATIENT)
Dept: LAB | Facility: CLINIC | Age: 60
End: 2025-04-08
Payer: COMMERCIAL

## 2025-04-08 DIAGNOSIS — D49.2 NEOPLASM OF UNSPECIFIED BEHAVIOR OF BONE, SOFT TISSUE, AND SKIN: ICD-10-CM

## 2025-04-08 PROCEDURE — 88305 TISSUE EXAM BY PATHOLOGIST: CPT | Mod: 26 | Performed by: DERMATOLOGY

## 2025-04-08 PROCEDURE — 88305 TISSUE EXAM BY PATHOLOGIST: CPT | Mod: TC,ORL | Performed by: DERMATOLOGY

## 2025-04-10 LAB
PATH REPORT.COMMENTS IMP SPEC: NORMAL
PATH REPORT.COMMENTS IMP SPEC: NORMAL
PATH REPORT.FINAL DX SPEC: NORMAL
PATH REPORT.GROSS SPEC: NORMAL
PATH REPORT.MICROSCOPIC SPEC OTHER STN: NORMAL
PATH REPORT.RELEVANT HX SPEC: NORMAL

## 2025-07-19 ENCOUNTER — TRANSFERRED RECORDS (OUTPATIENT)
Dept: HEALTH INFORMATION MANAGEMENT | Facility: CLINIC | Age: 60
End: 2025-07-19
Payer: COMMERCIAL

## 2025-08-20 ENCOUNTER — TRANSFERRED RECORDS (OUTPATIENT)
Dept: HEALTH INFORMATION MANAGEMENT | Facility: CLINIC | Age: 60
End: 2025-08-20
Payer: COMMERCIAL

## (undated) DEVICE — TUBING SUCTION MEDI-VAC 1/4"X20' N620A

## (undated) DEVICE — GLOVE PROTEXIS MICRO 7.0  2D73PM70

## (undated) DEVICE — DRSG TELFA 3X8" 1238

## (undated) DEVICE — GLOVE PROTEXIS BLUE W/NEU-THERA 7.5  2D73EB75

## (undated) DEVICE — SOL WATER IRRIG 500ML BOTTLE 2F7113

## (undated) DEVICE — SYR 10ML FINGER CONTROL W/O NDL 309695

## (undated) DEVICE — SUCTION MANIFOLD NEPTUNE 2 SYS 1 PORT 702-025-000

## (undated) DEVICE — Device

## (undated) DEVICE — KIT ENDO TURNOVER/PROCEDURE CARRY-ON 101822

## (undated) DEVICE — SOL WATER IRRIG 1000ML BOTTLE 2F7114

## (undated) DEVICE — GOWN IMPERVIOUS 2XL BLUE

## (undated) DEVICE — SNARE CAPIVATOR ROUND COLD SNR BX10 M00561101

## (undated) DEVICE — ESU GROUND PAD ADULT W/CORD E7507

## (undated) DEVICE — TUBING SUCTION 10'X3/16" N510

## (undated) DEVICE — KIT ENDO FIRST STEP DISINFECTANT 200ML W/POUCH EP-4

## (undated) DEVICE — SOL WATER IRRIG 3000ML BAG 2B7117

## (undated) DEVICE — SPECIMEN CONTAINER 3OZ W/FORMALIN 59901

## (undated) DEVICE — RETR RING LONE STAR 28.3X18.3CM W/CATH CLIPSX2 3308G

## (undated) DEVICE — JELLY LUBRICATING SURGILUBE 2OZ TUBE

## (undated) DEVICE — LINEN GOWN X4 5410

## (undated) DEVICE — NDL COUNTER 20CT 31142493

## (undated) DEVICE — LINEN TOWEL PACK X5 5464

## (undated) DEVICE — SUCTION TIP YANKAUER STR K87

## (undated) DEVICE — NDL 25GA 1.5" 305127

## (undated) DEVICE — PAD CHUX UNDERPAD 30X30"

## (undated) DEVICE — DRAPE GYN/UROLOGY FLUID POUCH TUR 29455

## (undated) DEVICE — RETR ELASTIC STAYS LONE STAR SHARP 5MM 8/PACK 3311-8G

## (undated) DEVICE — PACK CYSTO CUSTOM ASC

## (undated) RX ORDER — ACETAMINOPHEN 325 MG/1
TABLET ORAL
Status: DISPENSED
Start: 2017-01-17

## (undated) RX ORDER — PROPOFOL 10 MG/ML
INJECTION, EMULSION INTRAVENOUS
Status: DISPENSED
Start: 2017-01-17

## (undated) RX ORDER — FENTANYL CITRATE 50 UG/ML
INJECTION, SOLUTION INTRAMUSCULAR; INTRAVENOUS
Status: DISPENSED
Start: 2023-12-18

## (undated) RX ORDER — GABAPENTIN 300 MG/1
CAPSULE ORAL
Status: DISPENSED
Start: 2017-01-17

## (undated) RX ORDER — FENTANYL CITRATE 50 UG/ML
INJECTION, SOLUTION INTRAMUSCULAR; INTRAVENOUS
Status: DISPENSED
Start: 2017-01-17

## (undated) RX ORDER — ONDANSETRON 2 MG/ML
INJECTION INTRAMUSCULAR; INTRAVENOUS
Status: DISPENSED
Start: 2017-01-17